# Patient Record
Sex: FEMALE | Race: WHITE | NOT HISPANIC OR LATINO | URBAN - METROPOLITAN AREA
[De-identification: names, ages, dates, MRNs, and addresses within clinical notes are randomized per-mention and may not be internally consistent; named-entity substitution may affect disease eponyms.]

---

## 2017-03-20 ENCOUNTER — TELEPHONE (OUTPATIENT)
Dept: NURSING | Facility: CLINIC | Age: 27
End: 2017-03-20

## 2017-03-20 ENCOUNTER — OFFICE VISIT (OUTPATIENT)
Dept: FAMILY MEDICINE | Facility: CLINIC | Age: 27
End: 2017-03-20

## 2017-03-20 VITALS
TEMPERATURE: 98.3 F | BODY MASS INDEX: 23.73 KG/M2 | DIASTOLIC BLOOD PRESSURE: 76 MMHG | HEART RATE: 75 BPM | OXYGEN SATURATION: 99 % | SYSTOLIC BLOOD PRESSURE: 116 MMHG | WEIGHT: 148 LBS

## 2017-03-20 DIAGNOSIS — H66.91 RIGHT ACUTE OTITIS MEDIA: Primary | ICD-10-CM

## 2017-03-20 ASSESSMENT — PAIN SCALES - GENERAL: PAINLEVEL: MILD PAIN (3)

## 2017-03-20 NOTE — LETTER
March 20, 2017    RE: Katie Means  [address] [place] [address]        To whom it may concern:     Katie was seen in clinic today due to severe ear infection. She may need to postpone her travel for 48 hours after starting antibiotics. Please contact me with any questions.    Regards,       Essie Lugo PA-C

## 2017-03-20 NOTE — NURSING NOTE
Chief Complaint   Patient presents with     Ear Fullness     both ears, pain in right one.     26 year old      Blood pressure 116/76, pulse 75, temperature 98.3  F (36.8  C), temperature source Oral, weight 148 lb (67.1 kg), last menstrual period 03/13/2017, SpO2 99 %. Body mass index is 23.73 kg/(m^2).    Wt Readings from Last 2 Encounters:   03/20/17 148 lb (67.1 kg)   12/12/16 148 lb (67.1 kg)     BP Readings from Last 3 Encounters:   03/20/17 116/76   12/12/16 117/78   11/21/16 119/80       Patient Active Problem List   Diagnosis     Flexural eczema     MTHFR mutation (H)     Tension headache     History of urinary reflux       Current Outpatient Prescriptions   Medication Sig Dispense Refill     Ibuprofen (ADVIL PO) Take by mouth as needed for moderate pain       Levonorgestrel (JONEL IU)          Social History   Substance Use Topics     Smoking status: Never Smoker     Smokeless tobacco: Never Used     Alcohol use 0.0 oz/week     0 Standard drinks or equivalent per week      Comment: 4/week         Health Maintenance Due   Topic Date Due     CHLAMYDIA SCREENING  1990     HPV IMMUNIZATION (1 of 3 - Female 3 Dose Series) 05/24/2003       WHIT BARONE CMA  March 20, 2017 9:07 AM

## 2017-03-20 NOTE — TELEPHONE ENCOUNTER
"Call Type: Triage Call    Presenting Problem: Patient is having \"pressure and pain in the right  ear\" and is unable to sleep due to discomfort. Triaged for Ear:  symptoms/Disposition to see provider within 8 hours.  Triage Note:  Guideline Title: Ear: Symptoms  Recommended Disposition: See Provider within 8 Hours  Original Inclination: Wanted to speak with a nurse  Override Disposition:  Intended Action: See Dr/Keaton Appt  Physician Contacted: No  Severe pain (sharp, stabbing, throbbing or excruciating aching) unresponsive to 24  hours of home care ?  YES  Laceration/abrasion of ear ? NO  Bloody ear drainage ? NO  Any ear drainage in immunocompromised person ? NO  Blunt ear trauma resulting in a small mass/knot (hematoma) of the ear lobe ? NO  Recent trauma AND blood or clear fluid draining from ear(s) OR new deafness or  marked decrease in hearing ? NO  Sudden complete or partial hearing loss (three days or less) not associated with  any other signs or symptoms ? NO  Other head injury is of most concern ? NO  Any temperature elevation in an immunocompromised individual OR frail elderly with  signs of dehydration ? NO  Physician Instructions:  Care Advice: A warm washcloth or heating pad set on low to the affected ear  may help relieve the discomfort. May apply for 15 to 20 minutes, 3 to 4  times a day.  During pregnancy, call provider if temperature is 100 F (37.7 C) or greater  OR any temperature elevation for 3 days even while taking acetaminophen.  Do not use eardrops unless directed by a healthcare provider, especially if  having ear drainage.  Resting or sleeping with head elevated, such as semi-reclining in a  recliner, may help reduce inner ear pressure and discomfort.  CAUTIONS  SYMPTOM / CONDITION MANAGEMENT  Speak with provider as soon as possible if having:  - discharge from ear  that does not look like earwax or that has bad odor  - increased redness or  swelling of external ear  - worsening pain  - new " or worsening dizziness  -  or unsteadiness.  Keep the ear canal as dry as possible. To keep water out of ear when  showering or washing your hair, gently place a cotton ball in ear opening  and put petroleum jelly on the outside of the cotton ball. Avoid swimming  or water sports until okayed by provider.  Analgesic/Antipyretic Advice - NSAIDs: Consider aspirin, ibuprofen,  naproxen or ketoprofen for pain or fever as directed on label or by  pharmacist/provider. PRECAUTIONS: - You should not take this medicine for  more than 10 days unless recommended by your provider. EXCEPTIONS: - Should  not be used if taking blood thinners or have bleeding problems. - Do not  use if have history of sensitivity/allergy to any of these medications  or history of cardiovascular, ulcer, kidney, liver disease or diabetes  unless approved by provider. - Do not exceed recommended dose or frequency.  Analgesic/Antipyretic Advice - Acetaminophen: Consider acetaminophen as  directed on label or by pharmacist/provider for pain or fever. PRECAUTIONS:  - Use if there is no history of liver disease, alcoholism, or intake of  three or more alcohol drinks per day - Only if approved by provider during  pregnancy or when breastfeeding - Do not exceed recommended dose or  frequency. Do not take more than 3000 milligrams (mg) in 24 hours. Do not  take this medicine for more than 10 days unless recommended by your  provider. - During pregnancy, acetaminophen should not be taken more than 3  consecutive days without telling provider - To make sure you don't take too  much, check other medicines you take to see if they also contain  acetaminophen.

## 2017-03-20 NOTE — MR AVS SNAPSHOT
After Visit Summary   3/20/2017    Katie Means    MRN: [id number]           Patient Information     Date Of Birth          1990        Visit Information        Provider Department      3/20/2017 9:00 AM Essie Lugo PA-C [facility]        Today's Diagnoses     Right acute otitis media    -  1       Follow-ups after your visit        Who to contact     Please call your clinic at [phone] to:    Ask questions about your health    Make or cancel appointments    Discuss your medicines    Learn about your test results    Speak to your doctor   If you have compliments or concerns about an experience at your clinic, or if you wish to file a complaint, please contact [facility] Patient Relations at [phone] or email us at [email or web address]         Additional Information About Your Visit        MyChart Information     FLIP4NEW is an electronic gateway that provides easy, online access to your medical records. With [email or web address], you can request a clinic appointment, read your test results, renew a prescription or communicate with your care team.     To sign up for Ubersenset visit the website at [email or web address]   You will be asked to enter the access code listed below, as well as some personal information. Please follow the directions to create your username and password.     Your access code is: [email or web address]  Expires: 2017 10:22 AM     Your access code will  in 90 days. If you need help or a new code, please contact your [facility] or call [phone] for assistance.        Care EveryWhere ID     This is your Care EveryWhere ID. This could be used by other organizations to access your [facility] medical records  [id number]        Your Vitals Were     Pulse Temperature Last Period Pulse Oximetry BMI (Body Mass Index)       75 98.3  F (36.8  C) (Oral) 2017 99% 23.73 kg/m2        Blood Pressure  from Last 3 Encounters:   03/20/17 116/76   12/12/16 117/78   11/21/16 119/80    Weight from Last 3 Encounters:   03/20/17 148 lb (67.1 kg)   12/12/16 148 lb (67.1 kg)   11/21/16 144 lb 12 oz (65.7 kg)              Today, you had the following     No orders found for display         Today's Medication Changes          These changes are accurate as of: 3/20/17 10:22 AM.  If you have any questions, ask your nurse or doctor.               Start taking these medicines.        Dose/Directions    amoxicillin-clavulanate 875-125 MG per tablet   Commonly known as:  AUGMENTIN   Used for:  Right acute otitis media   Started by:  Essie Lugo PA-C        Dose:  1 tablet   Take 1 tablet by mouth 2 times daily   Quantity:  20 tablet   Refills:  0         Stop taking these medicines if you haven't already. Please contact your care team if you have questions.     albuterol 108 (90 BASE) MCG/ACT Inhaler   Commonly known as:  PROVENTIL HFA   Stopped by:  Essie Lugo PA-C           fluticasone 50 MCG/ACT spray   Commonly known as:  FLONASE   Stopped by:  Essie Lugo PA-C           pseudoePHEDrine 30 MG tablet   Commonly known as:  SUDAFED   Stopped by:  Essie Lugo PA-C                Where to get your medicines      These medications were sent to [facility]/pharmacy #[id number] - [place], [place]  [address] [address]     Phone:  [phone]     amoxicillin-clavulanate 875-125 MG per tablet                Primary Care Provider Office Phone # Fax #    Steph Lowe [phone]       [facility] [address]  [facility] [address]        Thank you!     Thank you for choosing [facility]  for your care. Our goal is always to provide you with excellent care. Hearing back from our patients is one way we can continue to improve our services. Please take a few minutes to complete the written survey that you may receive in the mail after your visit  with us. Thank you!             Your Updated Medication List - Protect others around you: Learn how to safely use, store and throw away your medicines at www.disposemymeds.org.          This list is accurate as of: 3/20/17 10:22 AM.  Always use your most recent med list.                   Brand Name Dispense Instructions for use    ADVIL PO      Take by mouth as needed for moderate pain       amoxicillin-clavulanate 875-125 MG per tablet    AUGMENTIN    20 tablet    Take 1 tablet by mouth 2 times daily       JONEL PALMER

## 2018-02-12 ENCOUNTER — NURSE TRIAGE (OUTPATIENT)
Dept: NURSING | Facility: CLINIC | Age: 28
End: 2018-02-12

## 2018-02-12 NOTE — TELEPHONE ENCOUNTER
who was Exposed to flu Dx one month ago and  currently exposed to  Pneumonia  For last 2 weeks  .  Cough started 48 hours without fever. Throat pain with coughing . Currently : 1&0 is ok, and activity is some less. Triage guideline  for cough - adult is homecare and call back if any further problems 24/7 , see care advice . Pt agrees and will watch for fever or shortness of breath .  FNA offered her appt but Pt prefers to watch symptoms for now.   .Lisset Abdi RN [facility] nurse advisors.    Additional Information    Negative: Severe difficulty breathing (e.g., struggling for each breath, speaks in single words)    Negative: Bluish lips, tongue, or face now    Negative: [1] Difficulty breathing AND [2] exposure to flames, smoke, or fumes    Negative: [1] Stridor AND [2] difficulty breathing    Negative: Sounds like a life-threatening emergency to the triager    Negative: [1] Previous asthma attacks AND [2] this feels like asthma attack    Negative: Dry (non-productive) cough (i.e., no sputum or minimal clear sputum)    Negative: Chest pain  (Exception: MILD central chest pain, present only when coughing)    Negative: Difficulty breathing    Negative: Patient sounds very sick or weak to the triager    Negative: [1] Coughed up blood AND [2] > 1 tablespoon (15 ml) (Exception: blood-tinged sputum)    Negative: Fever > 103 F (39.4 C)    Negative: [1] Fever > 101 F (38.3 C) AND [2] age > 60    Negative: [1] Fever > 101 F (38.3 C) AND [2] bedridden (e.g., nursing home patient, CVA, chronic illness, recovering from surgery)    Negative: [1] Fever > 100.5 F (38.1 C) AND [2] diabetes mellitus or weak immune system (e.g., HIV positive, cancer chemo, splenectomy, organ transplant, chronic steroids)    Negative: Wheezing is present    Negative: SEVERE coughing spells (e.g., whooping sound after coughing, vomiting after coughing)    Negative: [1] Continuous (nonstop) coughing interferes with work or school AND [2] no  improvement using cough treatment per Care Advice    Negative: Coughing up sydnee-colored (reddish-brown) sputum    Negative: Fever present > 3 days (72 hours)    Negative: [1] Fever returns after gone for over 24 hours AND [2] symptoms worse or not improved    Negative: [1] Sinus pain (around cheekbone or eye) AND [2] present > 24 hours using nasal washes and pain meds    Negative: Earache    Negative: [1] Known COPD or other severe lung disease (i.e., bronchiectasis, cystic fibrosis, lung surgery) AND [2] worsening symptoms (i.e., increased sputum purulence or amount, increased breathing difficulty    Negative: [1] Coughed up blood-tinged sputum AND [2] more than once    Negative: Cough present > 10 days    Negative: [1] Nasal discharge AND [2] present > 10 days    Negative: Exposure to TB (Tuberculosis)    Cough (all triage questions negative)    Protocols used: COUGH - ACUTE PRODUCTIVE-ADULT-

## 2018-02-15 ENCOUNTER — OFFICE VISIT (OUTPATIENT)
Dept: FAMILY MEDICINE | Facility: CLINIC | Age: 28
End: 2018-02-15
Payer: COMMERCIAL

## 2018-02-15 VITALS
WEIGHT: 146.25 LBS | SYSTOLIC BLOOD PRESSURE: 120 MMHG | OXYGEN SATURATION: 95 % | BODY MASS INDEX: 23.5 KG/M2 | DIASTOLIC BLOOD PRESSURE: 74 MMHG | HEIGHT: 66 IN | TEMPERATURE: 98.4 F | HEART RATE: 84 BPM

## 2018-02-15 DIAGNOSIS — J02.9 SORE THROAT: Primary | ICD-10-CM

## 2018-02-15 DIAGNOSIS — J06.9 VIRAL UPPER RESPIRATORY TRACT INFECTION: ICD-10-CM

## 2018-02-15 LAB — S PYO AG THROAT QL IA.RAPID: NEGATIVE

## 2018-02-15 ASSESSMENT — PAIN SCALES - GENERAL: PAINLEVEL: EXTREME PAIN (8)

## 2018-02-15 NOTE — PATIENT INSTRUCTIONS
A sinus infection is a secondary bacterial infection that usually occurs after a viral upper respiratory infection or with persistent congestion related to allergies. It can sometimes be treated without an antibiotic and the infection can resolve on its own.  For comfort saline nasal spray several times daily or use of a neti pot once daily can be helpful. (If you use a neti pot you should only use bottled/distilled water)  Over the counter flonase, two sprays in each nostril once daily will address the inflammation. You should use daily for atleast 2 weeks. If decongestants have been helpful for you in the past and you tolerate then we recommend Mucinex.  For the cough you can use Mucinex-DM.    Make sure you are drinking plenty of water, resting as able and you can use ibuprofen or tylenol for headache and fever if needed.    Please let me know if you have any questions.  Thank you for allowing me to participate in your care.  It was my pleasure meeting you.  Ana Maria Joya PA-C

## 2018-02-15 NOTE — PROGRESS NOTES
SUBJECTIVE:   Katie Means is a 27 year old female who presents to clinic today for the following health issues:      Acute Illness   Acute illness concerns: Sore thraot and sinus congestion for the past 4 days.      Fever: no    Chills/Sweats: no    Headache (location?): no    Sinus Pressure:YES    Conjunctivitis:  no    Ear Pain: no    Rhinorrhea: YES    Congestion: YES    Sore Throat: YES     Cough: YES-non-productive    Wheeze: no    Decreased Appetite: no    Nausea: no    Vomiting: no    Diarrhea:  no    Dysuria/Freq.: no    Fatigue/Achiness: no    Sick/Strep Exposure: YES- coworkers     Therapies Tried and outcome: Nothing currently    Problem list and histories reviewed & adjusted, as indicated.  Additional history: as documented    Patient Active Problem List   Diagnosis     Flexural eczema     MTHFR mutation (H)     Tension headache     History of urinary reflux     Past Surgical History:   Procedure Laterality Date     C LEVONORGESTREL-RELEASE INTRAUTERINE CONTRACEPTIVE (JONEL), 13.5 MG  4/2016    Jonel IUS, change in 3 yrs       Social History   Substance Use Topics     Smoking status: Never Smoker     Smokeless tobacco: Never Used     Alcohol use 0.0 oz/week     0 Standard drinks or equivalent per week      Comment: 4/week     Family History   Problem Relation Age of Onset     HEART DISEASE Maternal Grandmother      Hypertension Maternal Grandmother      HEART DISEASE Maternal Grandfather      Hypertension Maternal Grandfather      Hyperlipidemia Paternal Grandmother      Pancreatic Cancer Paternal Grandmother      Hyperlipidemia Paternal Grandfather      Hypertension Paternal Uncle      Lymphoma Maternal Uncle      Migraines Mother      CLOTTING DISORDER Maternal Uncle      Factor V Leiden         Current Outpatient Prescriptions   Medication Sig Dispense Refill     Ibuprofen (ADVIL PO) Take by mouth as needed for moderate pain       Levonorgestrel (JONEL IU)        amoxicillin-clavulanate (AUGMENTIN)  "875-125 MG per tablet Take 1 tablet by mouth 2 times daily (Patient not taking: Reported on 2/15/2018) 20 tablet 0       Reviewed and updated as needed this visit by clinical staff  Tobacco  Allergies  Meds  Problems  Med Hx  Surg Hx  Fam Hx  Soc Hx        Reviewed and updated as needed this visit by Provider  Allergies  Meds  Problems         ROS:  Constitutional, HEENT, cardiovascular, pulmonary, gi and gu systems are negative, except as otherwise noted.    OBJECTIVE:     /74 (BP Location: Right arm, Patient Position: Chair, Cuff Size: Adult Regular)  Pulse 84  Temp 98.4  F (36.9  C) (Temporal)  Ht 5' 6.42\" (168.7 cm)  Wt 146 lb 4 oz (66.3 kg)  SpO2 95%  BMI 23.31 kg/m2  Body mass index is 23.31 kg/(m^2).  GENERAL: healthy, alert and no distress  EYES: Eyes grossly normal to inspection, PERRL and conjunctivae and sclerae normal  HENT: ear canals and TM's normal,  mouth without ulcers or lesions.  No sinus pain with pressure. Posterior pharynx injected without inflammation or exudate.  NECK: no adenopathy, no asymmetry, masses, or scars and thyroid normal to palpation  RESP: lungs clear to auscultation - no rales, rhonchi or wheezes  CV: regular rate and rhythm, normal S1 S2, no S3 or S4, no murmur, click or rub, no peripheral edema and peripheral pulses strong  SKIN: no suspicious lesions or rashes    Diagnostic Test Results:  Results for orders placed or performed in visit on 02/15/18   Rapid Strep Screen (Group) ([place])   Result Value Ref Range    Rapid Strep A Screen NEGATIVE Negative   Throat Culture Aerobic Bacterial   Result Value Ref Range    Specimen Description Throat     Culture Micro Moderate growth  Normal celestine          ASSESSMENT/PLAN:       ICD-10-CM    1. Sore throat J02.9 Rapid Strep Screen (Group) ([place])     Throat Culture Aerobic Bacterial   2. Viral upper respiratory tract infection J06.9     B97.89        Patient Instructions   A sinus infection is a secondary " bacterial infection that usually occurs after a viral upper respiratory infection or with persistent congestion related to allergies. It can sometimes be treated without an antibiotic and the infection can resolve on its own.  For comfort saline nasal spray several times daily or use of a neti pot once daily can be helpful. (If you use a neti pot you should only use bottled/distilled water)  Over the counter flonase, two sprays in each nostril once daily will address the inflammation. You should use daily for atleast 2 weeks. If decongestants have been helpful for you in the past and you tolerate then we recommend Mucinex.  For the cough you can use Mucinex-DM.    Make sure you are drinking plenty of water, resting as able and you can use ibuprofen or tylenol for headache and fever if needed.    Please let me know if you have any questions.  Thank you for allowing me to participate in your care.  It was my pleasure meeting you.  Ana Maria Joya PA-C  [facility]

## 2018-02-15 NOTE — NURSING NOTE
"27 year old  Chief Complaint   Patient presents with     Recheck Medication     Onset: 5 days ago. Pt states she has a sore throat, sinuses hurt, ears hurt (they can't pop), cough, hard to swallow. Pain is an 8 in her throat.       Blood pressure 120/74, pulse 84, temperature 98.4  F (36.9  C), temperature source Temporal, height 5' 6.42\" (168.7 cm), weight 146 lb 4 oz (66.3 kg), SpO2 95 %. Body mass index is 23.31 kg/(m^2).  Patient Active Problem List   Diagnosis     Flexural eczema     MTHFR mutation (H)     Tension headache     History of urinary reflux       Wt Readings from Last 2 Encounters:   02/15/18 146 lb 4 oz (66.3 kg)   03/20/17 148 lb (67.1 kg)     BP Readings from Last 3 Encounters:   02/15/18 120/74   03/20/17 116/76   12/12/16 117/78         Current Outpatient Prescriptions   Medication     Ibuprofen (ADVIL PO)     Levonorgestrel (JONEL IU)     amoxicillin-clavulanate (AUGMENTIN) 875-125 MG per tablet     No current facility-administered medications for this visit.        Social History   Substance Use Topics     Smoking status: Never Smoker     Smokeless tobacco: Never Used     Alcohol use 0.0 oz/week     0 Standard drinks or equivalent per week      Comment: 4/week       Health Maintenance Due   Topic Date Due     HPV IMMUNIZATION (1 of 3 - Female 3 Dose Series) 05/24/2003     INFLUENZA VACCINE (SYSTEM ASSIGNED)  09/01/2017       Lab Results   Component Value Date    PAP NIL 11/21/2016       Julia Ortega MA  February 15, 2018 4:33 PM    "

## 2018-02-18 LAB
BACTERIA SPEC CULT: NORMAL
SPECIMEN SOURCE: NORMAL

## 2018-04-05 NOTE — PROGRESS NOTES
Katie is a 27 year old female who presents to [facility] with IUD concerns:   HPI:  Jonel IUD placed April 2016 [second IUD].  [facility] at [facility].  - Typically a normal light period every months since having the IUD but recently had spotting and a period one week early.  No pain or other issues.  Wondering if this is normal and if the IUD is in place.   ROS:  General: none  Head/Eyes: none  Ears/Nose/Throat: none  Cardiovascular: none  Respiratory: none  Mental Health: none  Endocrine: none  PROBLEM LIST:  Patient Active Problem List   Diagnosis     Flexural eczema     MTHFR mutation (H)     Tension headache     History of urinary reflux   OB/GYN HISTORY:   G0. Sexually active.   Obstetric History     No data available      PAST MEDICAL HISTORY:  No past medical history on file.  Life Style Modifiers:   Tobacco:  reports that she has never smoked. She has never used smokeless tobacco.   Alcohol:  reports that she drinks alcohol.   Drug use:  reports that she does not use illicit drugs.  PAST SURGICAL HISTORY:  Past Surgical History:   Procedure Laterality Date     C LEVONORGESTREL-RELEASE INTRAUTERINE CONTRACEPTIVE (JONEL), 13.5 MG  4/2016    Jonel IUS, change in 3 yrs   FAMILY HISTORY:  Family History   Problem Relation Age of Onset     HEART DISEASE Maternal Grandmother      Hypertension Maternal Grandmother      HEART DISEASE Maternal Grandfather      Hypertension Maternal Grandfather      Hyperlipidemia Paternal Grandmother      Pancreatic Cancer Paternal Grandmother      Hyperlipidemia Paternal Grandfather      Hypertension Paternal Uncle      Lymphoma Maternal Uncle      Migraines Mother      CLOTTING DISORDER Maternal Uncle      Factor V Leiden   SOCIAL HISTORY: Actorr - now at the [employer]  Social History     Social History     Marital status: Single     Spouse name: N/A     Number of children: N/A     Years of education: N/A     Occupational History     Not on file.     Social History Main Topics  "    Smoking status: Never Smoker     Smokeless tobacco: Never Used     Alcohol use 0.0 oz/week     0 Standard drinks or equivalent per week      Comment: 4/week     Drug use: No     Sexual activity: Yes     Partners: Male     Birth control/ protection: IUD     Other Topics Concern     Not on file     Social History Narrative   MEDICATIONS:  Current Outpatient Prescriptions   Medication Sig Dispense Refill     amoxicillin-clavulanate (AUGMENTIN) 875-125 MG per tablet Take 1 tablet by mouth 2 times daily (Patient not taking: Reported on 2/15/2018) 20 tablet 0     Ibuprofen (ADVIL PO) Take by mouth as needed for moderate pain       Levonorgestrel (JONEL IU)      ALLERGIES:  No clinical screening - see comments  VITALS:  /74  Pulse 70  Temp 98.3  F (36.8  C) (Oral)  Ht 5' 6.42\" (168.7 cm)  Wt 137 lb 8 oz (62.4 kg)  LMP 03/13/2018  SpO2 100%  BMI 21.92 kg/m2  PHYSICAL EXAM:  Constitutional: Well appearing woman in no acute distress.  Psychological: appropriate mood.  Genitourinary: External genitalia is normal appearance. No enlargement of the [place] or [place] glands. Urethra and bladder are non-tender. Vagina is without lesions or discharge. Normal epithelium, no anterior or posterior wall defects. Cervix is smooth, without lesions, the IUD string is in place. There is blood in the vaginal vault.  Adnexa without tenderness or enlarged.  Neurological: normal gait, no tremor.   Diagnoses and associated orders for this visit:  27 year old woman who has had a ann iud for five years with regular light periods.  This month the bleeding has been irregular.   Dysfunctional uterine bleeding with IUD in Place  -     US GYN Pelvic, Complete (In Clinic); Future        -     Pelvic exam shows a IUDstring in place because of the amount of bleeding and minimal fullness in the right adnexa will proceed with Pelvic US.         -     Pelvic US assure correct placement and normal endometrim        -     Suggested Mirena " IUD on replacement next year.

## 2018-04-06 ENCOUNTER — OFFICE VISIT (OUTPATIENT)
Dept: FAMILY MEDICINE | Facility: CLINIC | Age: 28
End: 2018-04-06
Payer: COMMERCIAL

## 2018-04-06 VITALS
WEIGHT: 137.5 LBS | TEMPERATURE: 98.3 F | OXYGEN SATURATION: 100 % | BODY MASS INDEX: 22.1 KG/M2 | SYSTOLIC BLOOD PRESSURE: 106 MMHG | DIASTOLIC BLOOD PRESSURE: 74 MMHG | HEART RATE: 70 BPM | HEIGHT: 66 IN

## 2018-04-06 DIAGNOSIS — T83.9XXS COMPLICATION OF INTRAUTERINE DEVICE (IUD), UNSPECIFIED COMPLICATION, SEQUELA: ICD-10-CM

## 2018-04-06 DIAGNOSIS — N93.8 DYSFUNCTIONAL UTERINE BLEEDING: Primary | ICD-10-CM

## 2018-04-06 NOTE — NURSING NOTE
"27 year old  Chief Complaint   Patient presents with     Vaginal Problem     pt is on 3 year venkatesh birth control. pt reports having a regular then a week later having some spotting for a few days and a week after that she had another cycle.       Blood pressure 106/74, pulse 70, temperature 98.3  F (36.8  C), temperature source Oral, height 5' 6.42\" (168.7 cm), weight 137 lb 8 oz (62.4 kg), last menstrual period 03/13/2018, SpO2 100 %. Body mass index is 21.92 kg/(m^2).  Patient Active Problem List   Diagnosis     Flexural eczema     MTHFR mutation (H)     Tension headache     History of urinary reflux       Wt Readings from Last 2 Encounters:   04/06/18 137 lb 8 oz (62.4 kg)   02/15/18 146 lb 4 oz (66.3 kg)     BP Readings from Last 3 Encounters:   04/06/18 106/74   02/15/18 120/74   03/20/17 116/76         Current Outpatient Prescriptions   Medication     amoxicillin-clavulanate (AUGMENTIN) 875-125 MG per tablet     Ibuprofen (ADVIL PO)     Levonorgestrel (VENKATESH IU)     No current facility-administered medications for this visit.        Social History   Substance Use Topics     Smoking status: Never Smoker     Smokeless tobacco: Never Used     Alcohol use 0.0 oz/week     0 Standard drinks or equivalent per week      Comment: 4/week       Health Maintenance Due   Topic Date Due     HPV IMMUNIZATION (1 of 3 - Female 3 Dose Series) 05/24/2003       Lab Results   Component Value Date    PAP NIL 11/21/2016 April 6, 2018 11:43 AM  "

## 2018-04-06 NOTE — MR AVS SNAPSHOT
After Visit Summary   2018    Katie Means    MRN: [id number]           Patient Information     Date Of Birth          1990        Visit Information        Provider Department      2018 11:40 AM Gisselle Riggins MD [facility]        Today's Diagnoses     Dysfunctional uterine bleeding    -  1    Complication of intrauterine device (IUD), unspecified complication, sequela          Care Instructions    Pelvic US - Call [phone]          Follow-ups after your visit        Future tests that were ordered for you today     Open Future Orders        Priority Expected Expires Ordered    US GYN Pelvic, Complete (In Clinic) Routine  2019            Who to contact     Please call your clinic at [phone] to:    Ask questions about your health    Make or cancel appointments    Discuss your medicines    Learn about your test results    Speak to your doctor            Additional Information About Your Visit        MyChart Information     [employer] is an electronic gateway that provides easy, online access to your medical records. With [employer], you can request a clinic appointment, read your test results, renew a prescription or communicate with your care team.     To sign up for World[employer] visit the website at [email or web address]   You will be asked to enter the access code listed below, as well as some personal information. Please follow the directions to create your username and password.     Your access code is: [email or web address]  Expires: 2018  5:24 PM     Your access code will  in 90 days. If you need help or a new code, please contact your [facility] or call [phone] for assistance.        Care EveryWhere ID     This is your Care EveryWhere ID. This could be used by other organizations to access your [place] medical records  [id number]        Your Vitals Were     Pulse Temperature Height Last Period Pulse Oximetry BMI (Body  "Mass Index)    70 98.3  F (36.8  C) (Oral) 1.687 m (5' 6.42\") 03/13/2018 100% 21.92 kg/m2       Blood Pressure from Last 3 Encounters:   04/06/18 106/74   02/15/18 120/74   03/20/17 116/76    Weight from Last 3 Encounters:   04/06/18 62.4 kg (137 lb 8 oz)   02/15/18 66.3 kg (146 lb 4 oz)   03/20/17 67.1 kg (148 lb)               Primary Care Provider Office Phone # Fax #    Steph Lowe [phone]       [address] [place] [address]        Equal Access to Services     ANTOINETTE CALVO : Alex Deras, wajenni lukaitlinadaha, qaybta kaalmada shaila, montez noyola . So [facility] [phone].    ATENCIÓN: Si habla español, tiene a grove disposición servicios gratuitos de asistencia lingüística. [facility] [phone].    We comply with applicable federal civil rights laws and [place] laws. We do not discriminate on the basis of race, color, national origin, age, disability, sex, sexual orientation, or gender identity.            Thank you!     Thank you for choosing [facility]  for your care. Our goal is always to provide you with excellent care. Hearing back from our patients is one way we can continue to improve our services. Please take a few minutes to complete the written survey that you may receive in the mail after your visit with us. Thank you!             Your Updated Medication List - Protect others around you: Learn how to safely use, store and throw away your medicines at www.disposemymeds.org.          This list is accurate as of 4/6/18 11:59 AM.  Always use your most recent med list.                   Brand Name Dispense Instructions for use Diagnosis    ADVIL PO      Take by mouth as needed for moderate pain        amoxicillin-clavulanate 875-125 MG per tablet    AUGMENTIN    20 tablet    Take 1 tablet by mouth 2 times daily    Right acute otitis media       JONEL IU             "

## 2018-04-09 ENCOUNTER — OFFICE VISIT (OUTPATIENT)
Dept: OBGYN | Facility: CLINIC | Age: 28
End: 2018-04-09
Attending: FAMILY MEDICINE
Payer: COMMERCIAL

## 2018-04-09 DIAGNOSIS — N92.6 IRREGULAR MENSTRUAL CYCLE: Primary | ICD-10-CM

## 2018-04-09 NOTE — MR AVS SNAPSHOT
After Visit Summary   4/9/2018    Katie Means    MRN: [id number]           Patient Information     Date Of Birth          1990        Visit Information        Provider Department      4/9/2018 9:00 AM [facility] ULTRAS[facility]        Today's Diagnoses     Irregular menstrual cycle    -  1       Follow-ups after your visit        Who to contact     Please call your clinic at [phone] to:    Ask questions about your health    Make or cancel appointments    Discuss your medicines    Learn about your test results    Speak to your doctor            Additional Information About Your Visit        VideoBursthart Information     Zuki gives you secure access to your electronic health record. If you see a primary care provider, you can also send messages to your care team and make appointments. If you have questions, please call your primary care clinic.  If you do not have a primary care provider, please call [phone] and they will assist you.      Zuki is an electronic gateway that provides easy, online access to your medical records. With Zuki, you can request a clinic appointment, read your test results, renew a prescription or communicate with your care team.     To access your existing account, please contact your [facility] or call [phone] for assistance.        Care EveryWhere ID     This is your Care EveryWhere ID. This could be used by other organizations to access your [place] medical records  [id number]        Your Vitals Were     Last Period                   03/13/2018            Blood Pressure from Last 3 Encounters:   04/06/18 106/74   02/15/18 120/74   03/20/17 116/76    Weight from Last 3 Encounters:   04/06/18 62.4 kg (137 lb 8 oz)   02/15/18 66.3 kg (146 lb 4 oz)   03/20/17 67.1 kg (148 lb)              Today, you had the following     No orders found for display       Primary Care Provider Office Phone # Fax #     Steph Lowe [phone]       [address] [place] [address]        Equal Access to Services     ANTOINETTE CALVO : Hadii aad ku hadloganhayden Deras, elaine chilel, matizurdo russmasharon mccallnorbertosharon, montez diyain hayaanohemy mccallalcides duarte jazmín lucio. So [facility] [phone].    ATENCIÓN: Si habla español, tiene a grove disposición servicios gratuitos de asistencia lingüística. Llame al [phone].    We comply with applicable federal civil rights laws and [place] laws. We do not discriminate on the basis of race, color, national origin, age, disability, sex, sexual orientation, or gender identity.            Thank you!     Thank you for choosing [facility]  for your care. Our goal is always to provide you with excellent care. Hearing back from our patients is one way we can continue to improve our services. Please take a few minutes to complete the written survey that you may receive in the mail after your visit with us. Thank you!             Your Updated Medication List - Protect others around you: Learn how to safely use, store and throw away your medicines at www.disposemymeds.org.          This list is accurate as of 4/9/18  2:21 PM.  Always use your most recent med list.                   Brand Name Dispense Instructions for use Diagnosis    ADVIL PO      Take by mouth as needed for moderate pain        amoxicillin-clavulanate 875-125 MG per tablet    AUGMENTIN    20 tablet    Take 1 tablet by mouth 2 times daily    Right acute otitis media       JONEL IU

## 2018-04-09 NOTE — LETTER
4/9/2018      RE: Katie Means  [address]   [facility] [address]       27 year old female with LMP 03/15/2018 presents for gynecologic ultrasound indicated by irregular bleeding, Valeria IUD, right adnexal fullness on exam.  This study was done transvaginally.    Uterine findings:   Presence: Visible Size: Normal 4.3x4.4x2.7 cm.  Endometrium = 2.6 mm. IUD in place.   Cx length = 40.7 mm.      Flexion:  Anteverted Position: Midline Margins: Smooth Shape: Normal   Contour: Regular Texture: Homogeneous Cavity: Normal Masses: Normal    Pelvic findings:    Right Adnexa: Normal   Left Adnexa: Normal   Bladder:  Normal         Cul - de - sac fluid: None    Ovarian follicles:   Right ovary:  3.6x3.8x2.9cm.     1 follicles     Size(s):  3.2 x 2.6 x 2.1cm     Left ovary:  3.3x2.5x2.3cm.     Small follicles     Size(s):  Less than 5mm    Comments:  Thin endometrial lining, Valeria in correct position.  Dominant follicle on right ovary, otherwise no other concerning findings.    JACOB Montes MD    S Ultrasound

## 2018-04-09 NOTE — PROGRESS NOTES
27 year old female with LMP 03/15/2018 presents for gynecologic ultrasound indicated by irregular bleeding, Valeria IUD, right adnexal fullness on exam.  This study was done transvaginally.    Uterine findings:   Presence: Visible Size: Normal 4.3x4.4x2.7 cm.  Endometrium = 2.6 mm. IUD in place.   Cx length = 40.7 mm.      Flexion:  Anteverted Position: Midline Margins: Smooth Shape: Normal   Contour: Regular Texture: Homogeneous Cavity: Normal Masses: Normal    Pelvic findings:    Right Adnexa: Normal   Left Adnexa: Normal   Bladder:  Normal         Cul - de - sac fluid: None    Ovarian follicles:   Right ovary:  3.6x3.8x2.9cm.     1 follicles     Size(s):  3.2 x 2.6 x 2.1cm     Left ovary:  3.3x2.5x2.3cm.     Small follicles     Size(s):  Less than 5mm    Comments:  Thin endometrial lining, Valeria in correct position.  Dominant follicle on right ovary, otherwise no other concerning findings.    JACOB Montes MD

## 2018-04-09 NOTE — LETTER
4/9/2018       RE: Katie Means  [address]   [facility] [address]     Dear Colleague,    Thank you for referring your patient, Katie Means, to the [facility] at [facility]. Please see a copy of my visit note below.    27 year old female with LMP 03/15/2018 presents for gynecologic ultrasound indicated by irregular bleeding, Valeria IUD, right adnexal fullness on exam.  This study was done transvaginally.    Uterine findings:   Presence: Visible Size: Normal 4.3x4.4x2.7 cm.  Endometrium = 2.6 mm. IUD in place.   Cx length = 40.7 mm.      Flexion:  Anteverted Position: Midline Margins: Smooth Shape: Normal   Contour: Regular Texture: Homogeneous Cavity: Normal Masses: Normal    Pelvic findings:    Right Adnexa: Normal   Left Adnexa: Normal   Bladder:  Normal         Cul - de - sac fluid: None    Ovarian follicles:   Right ovary:  3.6x3.8x2.9cm.     1 follicles     Size(s):  3.2 x 2.6 x 2.1cm     Left ovary:  3.3x2.5x2.3cm.     Small follicles     Size(s):  Less than 5mm    Comments:  Thin endometrial lining, Valeria in correct position.  Dominant follicle on right ovary, otherwise no other concerning findings.    JACOB Montes MD    Again, thank you for allowing me to participate in the care of your patient.      Sincerely,    [facility]

## 2019-02-04 ENCOUNTER — OFFICE VISIT (OUTPATIENT)
Dept: FAMILY MEDICINE | Facility: CLINIC | Age: 29
End: 2019-02-04
Payer: COMMERCIAL

## 2019-02-04 VITALS
RESPIRATION RATE: 16 BRPM | SYSTOLIC BLOOD PRESSURE: 109 MMHG | WEIGHT: 142 LBS | BODY MASS INDEX: 22.63 KG/M2 | TEMPERATURE: 98.4 F | OXYGEN SATURATION: 100 % | DIASTOLIC BLOOD PRESSURE: 72 MMHG | HEART RATE: 114 BPM

## 2019-02-04 DIAGNOSIS — J02.9 SORE THROAT: ICD-10-CM

## 2019-02-04 DIAGNOSIS — R52 GENERALIZED BODY ACHES: Primary | ICD-10-CM

## 2019-02-04 LAB
FLUAV AG UPPER RESP QL IA.RAPID: NEGATIVE
FLUBV AG UPPER RESP QL IA.RAPID: NEGATIVE
S PYO AG THROAT QL IA.RAPID: NEGATIVE

## 2019-02-04 RX ORDER — PENICILLIN V POTASSIUM 500 MG/1
500 TABLET, FILM COATED ORAL 2 TIMES DAILY
Qty: 20 TABLET | Refills: 0 | Status: SHIPPED | OUTPATIENT
Start: 2019-02-04 | End: 2019-02-04

## 2019-02-04 RX ORDER — PENICILLIN V POTASSIUM 500 MG/1
500 TABLET, FILM COATED ORAL 2 TIMES DAILY
Qty: 20 TABLET | Refills: 0 | Status: SHIPPED | OUTPATIENT
Start: 2019-02-04 | End: 2019-02-14

## 2019-02-04 NOTE — PROGRESS NOTES
Katie Means is a 28 year old female who presents to [facility] with pharyngitis, body aches and fever (to 100.6) for 2 days. Some fatigue. Denies cough. Some nausea. Slight headache.     Has otherwise been in usual state of health, e.g.   Cardiovascular: negative  Gastrointestinal: negative  Genitourinary: negative    Problem list per EMR:  Patient Active Problem List   Diagnosis     Flexural eczema     MTHFR mutation (H)     Tension headache     History of urinary reflux       Current Outpatient Medications   Medication Sig Dispense Refill     Ibuprofen (ADVIL PO) Take by mouth as needed for moderate pain       amoxicillin-clavulanate (AUGMENTIN) 875-125 MG per tablet Take 1 tablet by mouth 2 times daily (Patient not taking: Reported on 2/4/2019) 20 tablet 0     Levonorgestrel (JONEL IU)          Allergies   Allergen Reactions     No Clinical Screening - See Comments Nausea and Vomiting     Shell Fish         Social:     Works as an Actor    EXAM    Vitals: /72 (BP Location: Right arm, Patient Position: Sitting, Cuff Size: Adult Regular)   Pulse 114   Temp 98.4  F (36.9  C) (Oral)   Resp 16   Wt 64.4 kg (142 lb)   SpO2 100%   BMI 22.63 kg/m    BMI= Body mass index is 22.63 kg/m .  Appears well and in no distress.  HEENT remarkable for a very enlarged and erythematous LEFT tonsil. Slight exudate  Right tonsil appears normal  Tms - Normal  Neck supple. Shotty LEFT anterior Lymphadenopathy  CV--Tachycardic at about 110bpm. No murmurs.  Lungs - CTA    Results for orders placed or performed in visit on 02/04/19   Rapid Strep Screen (Group) ([facility])   Result Value Ref Range    Rapid Strep A Screen NEGATIVE Negative   Influenza A/B Antigen ([facility])   Result Value Ref Range    Influenza A NEGATIVE Negative    Influenza B NEGATIVE Negative       A/ Pharyngitis with unilateral markedly swollen tonsil and tachycardia    P/  Although the strep test was negative, her tonsil is markedly enlarged and given  the tachycardia, Katie is obviously fighting an infection (viral vs bacterial).  Suggested lots of fluids tonight. Also, use Aleve (discontinue ibuprofen)  If no better in 1-2 days, then start Pen 500 mg twice daily   Send a message with an update if possible. Return to clinic if needed or to Emergency room if tonsillar swelling worsens    --Ravin King MD  [facility], Department of Family Medicine and Community Health

## 2019-02-04 NOTE — NURSING NOTE
28 year old  Chief Complaint   Patient presents with     Pharyngitis     x 2 days     Generalized Body Aches     Fever     101.6 this morning       Blood pressure 109/72, pulse 114, temperature 98.4  F (36.9  C), temperature source Oral, resp. rate 16, weight 64.4 kg (142 lb), SpO2 100 %. Body mass index is 22.63 kg/m .  Patient Active Problem List   Diagnosis     Flexural eczema     MTHFR mutation (H)     Tension headache     History of urinary reflux       Wt Readings from Last 2 Encounters:   02/04/19 64.4 kg (142 lb)   04/06/18 62.4 kg (137 lb 8 oz)     BP Readings from Last 3 Encounters:   02/04/19 109/72   04/06/18 106/74   02/15/18 120/74         Current Outpatient Medications   Medication     Ibuprofen (ADVIL PO)     amoxicillin-clavulanate (AUGMENTIN) 875-125 MG per tablet     Levonorgestrel (JONEL IU)     No current facility-administered medications for this visit.        Social History     Tobacco Use     Smoking status: Never Smoker     Smokeless tobacco: Never Used   Substance Use Topics     Alcohol use: Yes     Alcohol/week: 0.0 oz     Comment: 4/week     Drug use: No       Health Maintenance Due   Topic Date Due     HPV IMMUNIZATION (1 - Female 3-dose series) 05/24/2003     HIV SCREEN (SYSTEM ASSIGNED)  05/24/2010     INFLUENZA VACCINE (1) 09/01/2018     PHQ-2 Q1 YR  02/15/2019       Lab Results   Component Value Date    PAP NIL 11/21/2016 February 4, 2019 3:06 PM

## 2019-02-04 NOTE — PATIENT INSTRUCTIONS
A/ Pharyngitis with unilateral markedly swollen tonsil and tachycardia    P/  Although the strep test was negative, her tonsil is markedly enlarged and given the tachycardia, Katie is obviously fighting an infection (viral vs bacterial).  Suggested lots of fluids tonight. Also, use Aleve (discontinue ibuprofen)  If no better in 1-2 days, then start Pen 500 mg twice daily   Send a message with an update if possible. Return to clinic if needed or to Emergency room if tonsillar swelling worsens

## 2019-02-06 LAB
BACTERIA SPEC CULT: ABNORMAL
BACTERIA SPEC CULT: ABNORMAL
Lab: ABNORMAL
SPECIMEN SOURCE: ABNORMAL

## 2019-10-16 ENCOUNTER — OFFICE VISIT (OUTPATIENT)
Dept: FAMILY MEDICINE | Facility: CLINIC | Age: 29
End: 2019-10-16
Payer: COMMERCIAL

## 2019-10-16 VITALS
SYSTOLIC BLOOD PRESSURE: 114 MMHG | TEMPERATURE: 98.5 F | WEIGHT: 140.25 LBS | DIASTOLIC BLOOD PRESSURE: 79 MMHG | BODY MASS INDEX: 22.35 KG/M2 | RESPIRATION RATE: 16 BRPM | HEART RATE: 69 BPM | OXYGEN SATURATION: 100 %

## 2019-10-16 DIAGNOSIS — H93.8X3 EAR CONGESTION, BILATERAL: Primary | ICD-10-CM

## 2019-10-16 NOTE — PROGRESS NOTES
"SUBJECTIVE:   Katie Means is a 29 year old female who presents to clinic today to discuss the following problem(s).    Ear Congestion  - for the past 2-3 weeks patient notes she has felt like one ear or the other, sometimes both, will feel \"clogged\" and it will feel like her hearing is muffle in that ear  - clogged ear changes back and forth  - she can feel facial congestion in maxillary sinus on the side her ear is plugged  - no pain, no heat, no dizziness  - no vision  changes  - no sore throat, no jaw tenderness, no difficulty swallowing  - no fever or chills  - no GI or  symptoms    ROS:   CONSTITUTIONAL: NEGATIVE for chills, fatigue, fever,sweats   EYES: NEGATIVE for vision changes or eye pain  ENT/MOUTH: Positive for ear and sinus congestion  RESP: NEGATIVE for cough, hemoptysis, SOB/dyspnea and wheezing  CV: NEGATIVE for chest pain/chest pressure, dyspnea on exertion  GI: NEGATIVE for abdominal pain, diarrhea, dysphagia and nausea  NEURO: NEGATIVE for dizziness/lightheadedness  HEME/ALLERGY/IMMUNE: NEGATIVE for swollen nodes  PSYCHIATRIC: NEGATIVE    Today's PHQ-2:  PHQ-2 ( 1999 Pfizer) 10/16/2019 2/4/2019   Q1: Little interest or pleasure in doing things 0 0   Q2: Feeling down, depressed or hopeless 0 0   PHQ-2 Score 0 0       No past medical history on file.  Past Surgical History:   Procedure Laterality Date     C LEVONORGESTREL-RELEASE INTRAUTERINE CONTRACEPTIVE (JONEL), 13.5 MG  4/2016    Jonel IUS, change in 3 yrs     Family History   Problem Relation Age of Onset     Heart Disease Maternal Grandmother      Hypertension Maternal Grandmother      Heart Disease Maternal Grandfather      Hypertension Maternal Grandfather      Hyperlipidemia Paternal Grandmother      Pancreatic Cancer Paternal Grandmother      Hyperlipidemia Paternal Grandfather      Hypertension Paternal Uncle      Lymphoma Maternal Uncle      Migraines Mother      Clotting Disorder Maternal Uncle         Factor V Leiden     Social " History     Tobacco Use     Smoking status: Never Smoker     Smokeless tobacco: Never Used   Substance Use Topics     Alcohol use: Yes     Alcohol/week: 0.0 standard drinks     Comment: 4/week     Drug use: No     Social History     Patient does not qualify to have social determinant information on file (likely too young).   Social History Narrative     Not on file       Current Outpatient Medications   Medication     Ibuprofen (ADVIL PO)     Levonorgestrel (JONEL IU)     No current facility-administered medications for this visit.      I have reviewed the patient's past medical, surgical, family, and social history.     OBJECTIVE:   /79 (BP Location: Right arm, Patient Position: Sitting, Cuff Size: Adult Regular)   Pulse 69   Temp 98.5  F (36.9  C) (Oral)   Resp 16   Wt 63.6 kg (140 lb 4 oz)   SpO2 100%   BMI 22.35 kg/m      Constitutional: well-appearing, appears stated age  ENT: Middle ears clear of cerumen, TMs appear clear and non-bulging bilaterally with no evidence of drainage  Cardiac: regular rate and rhythm, normal S1/S2, no murmur/rubs/gallops  Respiratory: lungs clear to auscultation bilaterally, normal work of breathing, no wheezes/crackles  Skin: no rashes, lesions, or wounds  Psych: affect is full and appropriate, speech is fluent and non-pressured    ASSESSMENT AND PLAN:     Katie was seen today for ear pressure.    Diagnoses and all orders for this visit:    Ear congestion, bilateral    Low suspicion for bacterial infection based on history and presentation. Discussed symptom management as noted in patient instructions including increased fluids, rest, Flonase, Afrin, Sudafed and steam. Advised patient of concerning symptoms indicating need for urgent reassessment as noted in patient instructions.      Dom Rojas MD  [facility]  10/16/2019, 10:49 AM

## 2019-10-16 NOTE — PATIENT INSTRUCTIONS
Patient Education     Sinusitis (No Antibiotics)    The sinuses are air-filled spaces within the bones of the face. They connect to the inside of the nose. Sinusitis is an inflammation of the tissue that lines the sinuses. Sinusitis can occur during a cold. It can also happen due to allergies to pollens and other particles in the air. It can cause symptoms such as sinus congestion, headache, sore throat, facial swelling, and a feeling of fullness. It may also cause a low-grade fever. Your sinusitis does not include an infection with bacteria. Because of this, antibiotics are not used to treat this problem.  Home care    Drink plenty of water, hot tea, and other liquids. This may help thin nasal mucus. It also may help your sinuses drain fluids.    Heat may help soothe painful areas of your face. Use a towel soaked in hot water. Or,  the shower and direct the warm spray onto your face. Using a vaporizer along with a menthol rub at night may also help soothe symptoms.     An expectorant with guaifenesin may help thin nasal mucus and help your sinuses drain fluids.    You can use an over-the-counter decongestant, unless a similar medicine was prescribed to you. Nasal sprays work the fastest. Use one that contains phenylephrine or oxymetazoline. First blow your nose gently. Then use the spray. Do not use these medicines more often than directed on the label. If you do, your symptoms may get worse. You may also take pills that contain pseudoephedrine. Don t use products that combine multiple medicines. This is because side effects may be increased. Read all medicine labels. You can also ask the pharmacist for help. (People with high blood pressure should not use decongestants. They can raise blood pressure.)    Over-the-counter antihistamines may help if allergies contributed to your sinusitis.      Use acetaminophen or ibuprofen to control pain, unless another pain medicine was prescribed to you. If you have  chronic liver or kidney disease or ever had a stomach ulcer, talk with your healthcare provider before using these medicines. (Aspirin should never be taken by anyone under age 18 who is ill with a fever. It may cause severe liver damage.)    Use nasal rinses or irrigation as instructed by your healthcare provider.    Don't smoke. This can make symptoms worse.  Follow-up care  Follow up with your healthcare provider or our staff if you are NOT better in 1 week.  When to seek medical advice  Call your healthcare provider if any of these occur:    Green or yellow fluid draining from your nose or into your throat    Facial pain or headache that gets worse    Stiff neck    Unusual drowsiness or confusion    Swelling of your forehead or eyelids    Vision problems, such as blurred or double vision    Fever of 100.4 F (38 C) or higher, or as directed by your healthcare provider    Seizure    Breathing problems    Symptoms that don't go away in 10 days  Date Last Reviewed: 11/1/2017 2000-2018 [email or web address]. [address] [place] [address]. All rights reserved. This information is not intended as a substitute for professional medical care. Always follow your healthcare professional's instructions.         Patient Education     Pseudoephedrine tablets  Brand Names: Genaphed, NASAL Decongestant, Nexafed, Pseudo-Time, Sudafed, Sudafed Congestion, Sudogest, Zephrex-D  What is this medicine?  PSEUDOEPHEDRINE (kim latham e FED rin) is a decongestant. It is used to treat congestion of the nose or sinuses.  How should I use this medicine?  Take this medicine by mouth with a glass of water. Follow the directions on the package or prescription label. Take your medicine at regular intervals. Do not take your medicine more often than directed.  Talk to your pediatrician regarding the use of this medicine in children. While this drug may be prescribed for children as young as 6 years of age for selected conditions,  precautions do apply.  Patients over 65 years old may have a stronger reaction and need a smaller dose.  What side effects may I notice from receiving this medicine?  Side effects that you should report to your doctor or health care professional as soon as possible:    allergic reactions like skin rash, itching or hives, swelling of the face, lips, or tongue    bloody diarrhea with stomach pain    breathing problems    chest pain    confused, agitated, nervous    fast, irregular heartbeat    feeling faint or lightheaded, falls    hallucinations    high blood pressure    pain, tingling, numbness in the hands or feet    trouble passing urine or change in the amount of urine    trouble sleeping  Side effects that usually do not require medical attention (report to your doctor or health care professional if they continue or are bothersome):    headache    loss of appetite    nausea, stomach upset  What may interact with this medicine?  Do not take this medicine with any of the following medications:    bromocriptine    ergot alkaloids like dihydroergotamine, ergonovine, ergotamine, methylergonovine    MAOIs like Carbex, Eldepryl, Marplan, Nardil, and Parnate    stimulant medicines for attention disorders, weight loss, or to stay awake  This medicine may also interact with the following medications:    alcohol    atropine    bretylium    caffeine    digoxin    linezolid    mecamylamine    medicines for blood pressure    medicines for depression, anxiety, or psychotic disturbances like fluoxetine, sertraline    medicines for enlarged prostate    medicines for sleep    other medicines for cold, cough, or allergy    procarbazine    reserpine    some heart medicines like metoprolol    Delmar's Wort  What if I miss a dose?  If you miss a dose, take it as soon as you can. If it is almost time for your next dose, take only that dose. Do not take double or extra doses.  Where should I keep my medicine?  Keep out of the reach  of children.  This medicine may cause accidental overdose and death if taken by other adults, children, or pets. Mix any unused medicine with a substance like cat littler or coffee grounds. Then throw the medicine away in a sealed container like a sealed bag or a coffee can with a lid. Do not use the medicine after the expiration date.  Store at room temperature between 15 and 25 degrees C (59 and 77 degrees F). Protect from heat and moisture.  What should I tell my health care provider before I take this medicine?  They need to know if you have any of the following conditions:    diabetes    glaucoma    heart disease    high blood pressure    kidney disease    prostate trouble    taken an MAOI like Carbex, Eldepryl, Marplan, Nardil, or Parnate in last 14 days    thyroid disease    trouble passing urine    an unusual or allergic reaction to pseudoephedrine, other medicines, foods, dyes, or preservatives    pregnant or trying to get pregnant    breast-feeding  What should I watch for while using this medicine?  Tell your doctor or healthcare professional if your symptoms do not start to get better or if they get worse. See your doctor if you are not better in 7 days or if you have a fever.  NOTE:This sheet is a summary. It may not cover all possible information. If you have questions about this medicine, talk to your doctor, pharmacist, or health care provider. Copyright  2019 [employer]

## 2019-10-16 NOTE — NURSING NOTE
29 year old  Chief Complaint   Patient presents with     Ear Pressure     x several weeks       Blood pressure 114/79, pulse 69, temperature 98.5  F (36.9  C), temperature source Oral, resp. rate 16, weight 63.6 kg (140 lb 4 oz), SpO2 100 %. Body mass index is 22.35 kg/m .  Patient Active Problem List   Diagnosis     Flexural eczema     MTHFR mutation (H)     Tension headache     History of urinary reflux       Wt Readings from Last 2 Encounters:   10/16/19 63.6 kg (140 lb 4 oz)   02/04/19 64.4 kg (142 lb)     BP Readings from Last 3 Encounters:   10/16/19 114/79   02/04/19 109/72   04/06/18 106/74         Current Outpatient Medications   Medication     Ibuprofen (ADVIL PO)     Levonorgestrel (JONEL IU)     No current facility-administered medications for this visit.        Social History     Tobacco Use     Smoking status: Never Smoker     Smokeless tobacco: Never Used   Substance Use Topics     Alcohol use: Yes     Alcohol/week: 0.0 standard drinks     Comment: 4/week     Drug use: No       Health Maintenance Due   Topic Date Due     HIV SCREENING  05/24/2007     PREVENTIVE CARE VISIT  11/21/2017     INFLUENZA VACCINE (1) 09/01/2019     PAP  11/21/2019       Lab Results   Component Value Date    PAP NIL 11/21/2016 October 16, 2019 10:47 AM

## 2019-12-05 ENCOUNTER — OFFICE VISIT (OUTPATIENT)
Dept: FAMILY MEDICINE | Facility: CLINIC | Age: 29
End: 2019-12-05
Payer: COMMERCIAL

## 2019-12-05 ENCOUNTER — NURSE TRIAGE (OUTPATIENT)
Dept: NURSING | Facility: CLINIC | Age: 29
End: 2019-12-05

## 2019-12-05 VITALS
BODY MASS INDEX: 21.27 KG/M2 | HEIGHT: 67 IN | SYSTOLIC BLOOD PRESSURE: 120 MMHG | WEIGHT: 135.5 LBS | DIASTOLIC BLOOD PRESSURE: 88 MMHG | RESPIRATION RATE: 18 BRPM | OXYGEN SATURATION: 96 % | HEART RATE: 120 BPM | TEMPERATURE: 100.6 F

## 2019-12-05 DIAGNOSIS — R50.9 PERSISTENT FEVER: Primary | ICD-10-CM

## 2019-12-05 DIAGNOSIS — R05.9 COUGH: ICD-10-CM

## 2019-12-05 RX ORDER — AZITHROMYCIN 250 MG/1
TABLET, FILM COATED ORAL
Qty: 6 TABLET | Refills: 0 | Status: SHIPPED | OUTPATIENT
Start: 2019-12-05 | End: 2020-02-10

## 2019-12-05 ASSESSMENT — MIFFLIN-ST. JEOR: SCORE: 1381.75

## 2019-12-05 NOTE — NURSING NOTE
"29 year old  Chief Complaint   Patient presents with     Fever     fever and headache x 5 day, cough start 2 days ago        Blood pressure 120/88, pulse 120, temperature 100.6  F (38.1  C), temperature source Oral, resp. rate 18, height 1.701 m (5' 6.97\"), weight 61.5 kg (135 lb 8 oz), last menstrual period 11/22/2019, SpO2 96 %. Body mass index is 21.24 kg/m .  Patient Active Problem List   Diagnosis     Flexural eczema     MTHFR mutation (H)     Tension headache     History of urinary reflux       Wt Readings from Last 2 Encounters:   12/05/19 61.5 kg (135 lb 8 oz)   10/16/19 63.6 kg (140 lb 4 oz)     BP Readings from Last 3 Encounters:   12/05/19 120/88   10/16/19 114/79   02/04/19 109/72         Current Outpatient Medications   Medication     Ibuprofen (ADVIL PO)     Levonorgestrel (JONEL IU)     No current facility-administered medications for this visit.        Social History     Tobacco Use     Smoking status: Never Smoker     Smokeless tobacco: Never Used   Substance Use Topics     Alcohol use: Yes     Alcohol/week: 0.0 standard drinks     Comment: 4/week     Drug use: No       Health Maintenance Due   Topic Date Due     HIV SCREENING  05/24/2007     PREVENTIVE CARE VISIT  11/21/2017     INFLUENZA VACCINE (1) 09/01/2019     PAP  11/21/2019       Lab Results   Component Value Date    PAP NIL 11/21/2016 December 5, 2019 2:24 PM    "

## 2019-12-05 NOTE — PATIENT INSTRUCTIONS
Patient Education     Treating Pneumonia  Pneumonia is an infection of one or both of the lungs. Pneumonia:    Is usually caused by either a virus or a bacteria    Can be very serious, especially in infants, young children, and older adults. It s also serious for those with other long-term health problems or weakened immune systems.    Is sometimes treated at home and sometimes in the hospital    Antibiotic medicines  Antibiotics may be prescribed for pneumonia caused by bacteria. They may be pills (oral medicines), or shots (injections). Or they may be given by IV (intravenously) into a vein. If you are taking oral medicines at home:    Fill your prescription and start taking your medicine as soon as you can.    You will likely start to feel better in a day or 2, but don t stop taking the antibiotic.    Use a pill organizer to help you remember to take your medicine.    Let your healthcare provider know if you have side effects.    Take your medicine exactly as directed on the label. Talk to your provider or pharmacist if you have any questions.  Antiviral medicines  Antiviral medicine may be prescribed for pneumonia caused by a virus. For example, antiviral medicine may be prescribed for pneumonia caused by the flu virus. Antibiotics do not work against viruses. If you are taking antiviral medicine at home:    Fill your prescription and start taking your medicine as soon as you can.    Talk with your provider or pharmacist about possible side effects.    Take the medicine exactly as instructed.  To relieve symptoms  There are many medicines that can help relieve symptoms of pneumonia. Some are prescription and some are over-the-counter.  Your healthcare provider may recommend:    Acetaminophen or ibuprofen to lower your fever and to lessen headache or other pain    Cough medicine to loosen mucus or to reduce coughing  Make sure you check with your healthcare provider or pharmacist before taking any  over-the-counter medicines.  Special treatments  If you are hospitalized for pneumonia, you may have other therapies, including:    Inhaled medicines to help with breathing or chest congestion    Supplemental oxygen to increase low oxygen levels  Drink fluids and eat healthy  You should eat healthy to help your body fight the infection. Drinking a lot of fluids helps to replace fluids lost from fever and to loosen mucus in your chest.    Diet. Make healthy food choices, including fruits and vegetables, lean meats and other proteins, 100% whole grain and low- or no-fat dairy products.    Fluids. Drink at least 6 to 8 tall glasses a day. Water and 100% fruit or vegetable juice are best.  Get plenty of rest and sleep  You may be more tired than usual for a while. It is important to get enough sleep at night. It s also important to rest during the day. Talk with your healthcare provider if coughing or other symptoms are interfering with your sleep.  Preventing the spread of germs  The best thing you can do to prevent spreading germs is to wash your hands often. You should:    Rub your hand with soap and water for 20 to 30 seconds.    Clean in between your fingers, the backs of your hands, and around your nails.    Dry your hands on a separate towel or use paper towels.  You should also:    Keep alcohol-based hand  nearby.    Make sure you also clean surfaces that you touch. Use a product that kills all types of germs.    Stay away from others until you are feeling better.  When to call your healthcare provider  Call your healthcare provider if you have any of the following:    Symptoms get worse    Fever continues    Shortness of breath gets worse    Increased mucus or mucus that is darker in color    Coughing gets worse    Lips or fingers are bluish in color    Side effects from your medicine   Date Last Reviewed: 12/1/2016 2000-2018 [employer]. [address] [place] [address], [place] [address]. All  rights reserved. This information is not intended as a substitute for professional medical care. Always follow your healthcare professional's instructions.

## 2019-12-05 NOTE — PROGRESS NOTES
SUBJECTIVE:   Katie Means is a 29 year old female who presents to clinic today to discuss the following problem(s).    Fever, headache, developing cough  - felt a little under on Saturday  - fever and headache by Sunday night  - without Advil temp up to 101.6  - fever has been persistent for the past 5 days  - has been taking advil regularly  - cough started on Tuesday  - otherwise negative ROS as noted below    ROS:   CONSTITUTIONAL: Chills, fatigue, fever,sweats  ENT/MOUTH: Minimal nasal congestion, postnasal drainage and rhinorrhea  RESP: Cough, hemoptysis, SOB/dyspnea and wheezing  CV: NEGATIVE for chest pain/chest pressure, dyspnea on exertion  GI: NEGATIVE for abdominal pain, diarrhea, dysphagia and nausea  : NEGATIVE for dysuria, frequency and hematuria  MUSCULOSKELETAL: Mild body aches  INTEGUMENTARY/SKIN: NEGATIVE for new lesions and pruritis  NEURO: Headache. NEGATIVE for dizziness/lightheadedness, gait disturbance, memory problems and paresthesias   PSYCHIATRIC: NEGATIVE     Today's PHQ-2:  PHQ-2 ( 1999 Pfizer) 12/5/2019 10/16/2019   Q1: Little interest or pleasure in doing things 0 0   Q2: Feeling down, depressed or hopeless 0 0   PHQ-2 Score 0 0       History reviewed. No pertinent past medical history.  Past Surgical History:   Procedure Laterality Date     C LEVONORGESTREL-RELEASE INTRAUTERINE CONTRACEPTIVE (JONEL), 13.5 MG  4/2016    Jonel IUS, change in 3 yrs     Family History   Problem Relation Age of Onset     Heart Disease Maternal Grandmother      Hypertension Maternal Grandmother      Heart Disease Maternal Grandfather      Hypertension Maternal Grandfather      Hyperlipidemia Paternal Grandmother      Pancreatic Cancer Paternal Grandmother      Hyperlipidemia Paternal Grandfather      Hypertension Paternal Uncle      Lymphoma Maternal Uncle      Migraines Mother      Clotting Disorder Maternal Uncle         Factor V Leiden     Social History     Tobacco Use     Smoking status: Never Smoker  "    Smokeless tobacco: Never Used   Substance Use Topics     Alcohol use: Yes     Alcohol/week: 0.0 standard drinks     Comment: 4/week     Drug use: No     Social History     Social History Narrative     Not on file       Current Outpatient Medications   Medication     Ibuprofen (ADVIL PO)     Levonorgestrel (JONEL IU)     No current facility-administered medications for this visit.      I have reviewed the patient's past medical, surgical, family, and social history.     OBJECTIVE:   /88 (BP Location: Right arm, Patient Position: Sitting, Cuff Size: Adult Regular)   Pulse 120   Temp 100.6  F (38.1  C) (Oral)   Resp 18   Ht 1.701 m (5' 6.97\")   Wt 61.5 kg (135 lb 8 oz)   LMP 11/22/2019   SpO2 96%   BMI 21.24 kg/m      Constitutional: well-appearing, appears stated age  Eyes: conjunctivae without erythema, sclera anicteric.   ENT: mild lymphadenopathy, tonsilar erythema, no visible posterior drainage  Cardiac: regular rate and rhythm, normal S1/S2, no murmur/rubs/gallops  Respiratory: cough, lungs clear to auscultation bilaterally, normal work of breathing, no wheezes/crackles  Skin: general warmth, no rashes, lesions, or wounds  Psych: affect is full and appropriate, speech is fluent and non-pressured    ASSESSMENT AND PLAN:     Katie was seen today for fever.    Diagnoses and all orders for this visit:    Persistent fever  -     azithromycin (ZITHROMAX) 250 MG tablet; Take 2 tablets (500 mg) by mouth daily for 1 day, THEN 1 tablet (250 mg) daily for 4 days.    Cough    Given duration of fever I am concerned for ongoing bacterial infection. Will treat for suspect lower respiratory infection although I was unable to get a chest XR at this time as there is not an XR technician available at this time.    Discussed symptom management and advised patient of concerning symptoms indicating a need for further medical advice as noted in patient instructions.       Dom Rojas MD  [facility] " Clinic  12/05/2019, 2:27 PM

## 2019-12-05 NOTE — TELEPHONE ENCOUNTER
"    Additional Information    Negative: Difficult to awaken or acting confused (e.g., disoriented, slurred speech)    Negative: Pale cold skin and very weak (can't stand)    Negative: Difficulty breathing and bluish (or gray) lips or face    Negative: New onset rash with purple (or blood-colored) spots or dots    Negative: Sounds like a life-threatening emergency to the triager    Negative: Fever onset within 24 hours of receiving vaccine    Negative: Fever within 21 days of Ebola EXPOSURE    Negative: Headache and stiff neck (can't touch chin to chest)    Negative: Difficulty breathing    Negative: IV drug abuse    Negative: Fever > 103 F (39.4 C)    Negative: Fever > 101 F (38.3 C) and over 60 years of age    Negative: Fever > 100.0 F (37.8 C) and diabetes mellitus or a weak immune system (e.g., HIV positive, chemotherapy, splenectomy)    Negative: Fever > 100.0 F (37.8 C) and bedridden (e.g., nursing home patient, stroke, chronic illness, recovering from surgery)    Negative: Fever > 100.0 F (37.8 C) and indwelling urinary catheter (e.g., Ennis, coude)    Negative: Fever > 100.5 F (38.1 C) and has port (portacath), central line, or PICC line    Negative: Drinking very little and has signs of dehydration (e.g., no urine > 12 hours, very dry mouth, very lightheaded)    Negative: Patient sounds very sick or weak to the triager    Negative: Fever > 100.5 F (38.1 C) and surgery in the past month    Negative: Transplant patient (e.g., liver, heart, lung, kidney)    Negative: Widespread rash and cause unknown    Negative: Patient wants to be seen    Fever present > 3 days (72 hours)    Answer Assessment - Initial Assessment Questions  1. TEMPERATURE: \"What is the most recent temperature?\"  \"How was it measured?\"        orally  2. ONSET: \"When did the fever start?\"       Today is the 5th day  3. SYMPTOMS: \"Do you have any other symptoms besides the fever?\"  (e.g., colds, headache, sore throat, earache, cough, rash, " "diarrhea, vomiting, abdominal pain)      Headache and cough  4. CAUSE: If there are no symptoms, ask: \"What do you think is causing the fever?\"       ?  5. CONTACTS: \"Does anyone else in the family have an infection?\"      no  6. TREATMENT: \"What have you done so far to treat this fever?\" (e.g., medications)      Advil, tylenol  7. IMMUNOCOMPROMISE: \"Do you have of the following: diabetes, HIV positive, splenectomy, cancer chemotherapy, chronic steroid treatment, transplant patient, etc.\"      no  8. PREGNANCY: \"Is there any chance you are pregnant?\" \"When was your last menstrual period?\"      no  9. TRAVEL: \"Have you traveled out of the country in the last month?\" (e.g., travel history, exposures)      no    Protocols used: FEVER-A-OH      "

## 2019-12-16 ENCOUNTER — OFFICE VISIT (OUTPATIENT)
Dept: FAMILY MEDICINE | Facility: CLINIC | Age: 29
End: 2019-12-16
Payer: COMMERCIAL

## 2019-12-16 VITALS
HEIGHT: 67 IN | OXYGEN SATURATION: 98 % | TEMPERATURE: 98.3 F | HEART RATE: 92 BPM | SYSTOLIC BLOOD PRESSURE: 124 MMHG | DIASTOLIC BLOOD PRESSURE: 88 MMHG | RESPIRATION RATE: 17 BRPM | BODY MASS INDEX: 21.66 KG/M2 | WEIGHT: 138 LBS

## 2019-12-16 DIAGNOSIS — J40 BRONCHITIS: Primary | ICD-10-CM

## 2019-12-16 RX ORDER — ALBUTEROL SULFATE 90 UG/1
2 AEROSOL, METERED RESPIRATORY (INHALATION) EVERY 4 HOURS PRN
Qty: 1 INHALER | Refills: 0 | Status: SHIPPED | OUTPATIENT
Start: 2019-12-16 | End: 2020-02-10

## 2019-12-16 ASSESSMENT — MIFFLIN-ST. JEOR: SCORE: 1393.09

## 2019-12-16 NOTE — PATIENT INSTRUCTIONS
Katie is a 30 yo with a post-URI cough. Symptoms of fever improved after Azithromax, but cough has persisted.    PLAN  Discussed symptomatic cares with fluids, humidifier and OTC cough meds  Also, Rx for Albuterol inhaler. Videos on technique described.   Try Naprosyn twice daily with food for a few days.   Let us know if symptoms do not improve

## 2019-12-16 NOTE — PROGRESS NOTES
"REASON FOR VISIT:  Persistent Cough      HISTORY OF PRESENT ILLNESS: Katie Means is a 29 year old female who presents for a persistent cough.  I have met her twice since 2016, most recently in 02/2019 for generalized body aches. [place] [facility] [place] Dr. Rojas, with a persistent fever and a developing headache and cough.  She was given azithromycin for 5 days, which resolved her fever, but her cough was never resolved.  She took her last dose of azithromycin on 12/09/2019, and since then, her cough has become more frequent, specifically in the past few days.  Her fever has not returned.  She has been taking Delsym at night to treat her cough.  She has some upcoming travel, and wanted to return to the clinic before leaving town on 12/20/2019 for 10 days.        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     Current Outpatient Medications   Medication Sig Dispense Refill     albuterol (PROVENTIL HFA) 108 (90 Base) MCG/ACT inhaler Inhale 2 puffs into the lungs every 4 hours as needed for shortness of breath / dyspnea or wheezing 1 Inhaler 0     Ibuprofen (ADVIL PO) Take by mouth as needed for moderate pain       Levonorgestrel (JONEL IU)            REVIEW OF SYSTEMS:  POSITIVE for persistent cough.  Fever has resolved. Otherwise, the ROS is negative.      OBJECTIVE:      EXAM: Katie is a 29 year old female, with a cough, who appears to be otherwise healthy.  VITAL SIGNS: /88   Pulse 92   Temp 98.3  F (36.8  C) (Oral)   Resp 17   Ht 1.701 m (5' 6.97\")   Wt 62.6 kg (138 lb)   LMP 11/22/2019   SpO2 98%   Breastfeeding No   BMI 21.63 kg/m    Constitutional: healthy, alert and no distress   Cardiovascular: negative, PMI normal. No lifts, heaves, or " thrills. RRR. No murmurs, clicks gallops or rub  Respiratory: Lungs clear bilaterally   Neck: Neck supple. No adenopathy.  ENT: Ear and throat exam normal, no neck nodes or sinus tenderness. Nasal mucosa slightly congested.        ASSESSMENT AND PLAN:   1. Katie is a 28 yo with a post-URI cough. Symptoms of fever improved after Azithromax, but cough has persisted.     PLAN  Discussed symptomatic cares with fluids, humidifier and OTC cough meds  Also, Rx for Albuterol inhaler. Videos on technique described.   Try Naprosyn twice daily with food for a few days.   Let us know if symptoms do not improve       Call with any problems or questions.          I, Joe Neff, am serving as a scribe to document services personally performed by Ravin King MD based on data collection and the provider's statements to me. Dr. King has reviewed, edited and approved the above note.     --Ravin King MD

## 2019-12-16 NOTE — NURSING NOTE
"29 year old  Chief Complaint   Patient presents with     Cough     was seen a few weeks ago and put on antibiotics that did help some but cough has got more frequent in the last few days. Pt is going out of town on Friday.       Blood pressure 124/88, pulse 92, temperature 98.3  F (36.8  C), temperature source Oral, resp. rate 17, height 1.701 m (5' 6.97\"), weight 62.6 kg (138 lb), last menstrual period 11/22/2019, SpO2 98 %, not currently breastfeeding. Body mass index is 21.63 kg/m .  Patient Active Problem List   Diagnosis     Flexural eczema     MTHFR mutation (H)     Tension headache     History of urinary reflux       Wt Readings from Last 2 Encounters:   12/16/19 62.6 kg (138 lb)   12/05/19 61.5 kg (135 lb 8 oz)     BP Readings from Last 3 Encounters:   12/16/19 124/88   12/05/19 120/88   10/16/19 114/79         Current Outpatient Medications   Medication     Ibuprofen (ADVIL PO)     Levonorgestrel (JONEL IU)     No current facility-administered medications for this visit.        Social History     Tobacco Use     Smoking status: Never Smoker     Smokeless tobacco: Never Used   Substance Use Topics     Alcohol use: Yes     Alcohol/week: 0.0 standard drinks     Comment: 4/week     Drug use: No       Health Maintenance Due   Topic Date Due     HIV SCREENING  05/24/2007     PREVENTIVE CARE VISIT  11/21/2017     INFLUENZA VACCINE (1) 09/01/2019     PAP  11/21/2019       Lab Results   Component Value Date    PAP NIL 11/21/2016 December 16, 2019 4:32 PM  "

## 2020-01-28 DIAGNOSIS — J40 BRONCHITIS: ICD-10-CM

## 2020-01-28 RX ORDER — ALBUTEROL SULFATE 90 UG/1
AEROSOL, METERED RESPIRATORY (INHALATION)
Qty: 18 INHALER | Refills: 0 | OUTPATIENT
Start: 2020-01-28

## 2020-01-28 NOTE — TELEPHONE ENCOUNTER
I spoke to patient, she was given albuterol inhaler for bronchitis acute problem and no longer needs this. She does not want this inhaler refilled.     Julia Lara RN  01/28/20  3:13 PM

## 2020-02-07 NOTE — PROGRESS NOTES
Katie Means is here for a general check up. She is not fasting. She is due for a dental visit. No visual concerns. Wears seat belt-yes. Bike helmet- n/a. Concerns today:    HCM  Katie is a 29 year old female here for a general check up. Due for Tdap and flu vaccines, declines. She is due for a pap. Last pap was in 2016 (no co-testing) and was normal. Diet is healthy with fish and vegetables. She does not eat red meat. No history of anemia.     Health Maintenance   Topic Date Due     PAP  11/21/2019     DTAP/TDAP/TD IMMUNIZATION (2 - Td) 08/20/2020     PREVENTIVE CARE VISIT  02/10/2021     HIV SCREENING  Completed     PHQ-2  Completed     INFLUENZA VACCINE  Addressed     IPV IMMUNIZATION  Aged Out     MENINGITIS IMMUNIZATION  Aged Out         Patient Active Problem List   Diagnosis     Flexural eczema     MTHFR mutation (H)     Tension headache     History of urinary reflux       Past Surgical History:   Procedure Laterality Date     C LEVONORGESTREL-RELEASE INTRAUTERINE CONTRACEPTIVE (JONEL), 13.5 MG  4/2016    Jonel IUS, change in 3 yrs     REMOVE INTRAUTERINE DEVICE      jonel       Family History   Problem Relation Age of Onset     Heart Disease Maternal Grandmother      Hypertension Maternal Grandmother      Heart Disease Maternal Grandfather      Hypertension Maternal Grandfather      Hyperlipidemia Paternal Grandmother      Pancreatic Cancer Paternal Grandmother      Hyperlipidemia Paternal Grandfather      Hypertension Paternal Uncle      Lymphoma Maternal Uncle      Migraines Mother      Clotting Disorder Maternal Uncle         Factor V Leiden       Social  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 Vitamin D and calcium recommendations were reviewed and information sheet was given.   Caffeine: none  Exercise: Runner yoga 3x per week    OB/GYN HISTORY:  LMP: Patient's last menstrual period was 02/05/2020.  Periods are regular, 5 days of  "bleeding  Hx abnormal pap? no  STD hx? none  cycle length: monthly  dysmenorrhea/PMS: none  Vasomotor sx:  none  Contraception: none, female partner  G 0   P 0   A 0  Self Breast exam:  yes    Current Outpatient Medications   Medication Sig Dispense Refill     Ibuprofen (ADVIL PO) Take by mouth as needed for moderate pain       Allergies   Allergen Reactions     No Clinical Screening - See Comments Nausea and Vomiting     Shell Fish          ROS  CONSTITUTIONAL:NEGATIVE for fever, chills, change in weight  INTEGUMENTARY/SKIN: NEGATIVE for worrisome rashes, moles or lesions  EYES: NEGATIVE for vision changes or irritation  ENT/MOUTH: NEGATIVE for ear, mouth and throat problems  RESP:NEGATIVE for significant cough or SOB  BREAST: NEGATIVE for masses, tenderness or discharge  CV: NEGATIVE for chest pain, palpitations, PINK, orthopnea, PND  or peripheral edema  GI: NEGATIVE for nausea, abdominal pain, heartburn, or change in bowel habits  :NEGATIVE for frequency, dysuria, or hematuria  MUSCULOSKELETAL:NEGATIVE for significant arthralgias or myalgia  NEURO: NEGATIVE for weakness, dizziness or paresthesias  ENDOCRINE: NEGATIVE for polyuria/dipsia,  temperature intolerance, skin/hair changes  HEME/ALLERGY/IMMUNE: NEGATIVE for bleeding problems  PSYCHIATRIC: NEGATIVE for changes in mood or affect    EXAM  /75 (BP Location: Left arm, Patient Position: Sitting, Cuff Size: Adult Regular)   Pulse 74   Temp 97.8  F (36.6  C) (Oral)   Resp 16   Ht 1.68 m (5' 6.14\")   Wt 62.1 kg (137 lb)   LMP 02/05/2020   SpO2 98%   BMI 22.02 kg/m    GENERAL APPEARANCE: Alert, pleasant, NAD  EYES: PERRL, EOMI, conjunctiva clear  HENT: TM normal bilaterally. Nose and mouth without lesions  NECK: no adenopathy, thyroid normal to palpation. No carotid bruits.   RESP: lungs clear to auscultation bilaterally, no rhonchi, wheezes or rales   BREAST: normal without masses, no tenderness or nipple discharge and no palpable  axillary masses " or adenopathy   CV: regular rate and rhythm, normal S1 S2, no murmur, no carotid bruits  ABDOMEN: soft, nontender, without HSM or masses. Bowel sounds normal  : External genitalia without lesions, cervix normal, pap easily obtained, no abnormal discharge, bimanual exam without adnexal masses or tenderness, uterus non enlarged  RECTAL EXAM: not done   MS: extremities normal- no gross deformities noted, no tender, hot or swollen joints.    SKIN: no suspicious lesions or rashes  NEURO: Normal strength and tone, sensory exam grossly normal, DTR normoreflexive in upper and lower extremities  PSYCH: mentation appears normal. and affect normal/bright.  EXT: no peripheral edema, pedal pulses palpable    Assessment:  (Z00.00) Routine general medical examination at a health care facility  (primary encounter diagnosis)  Comment: Healthy 29 year old female. Declines flu and Tdap vaccines.   Plan: Comprehensive Metabolic Panel ([facility]), Vitamin        D Deficiency, Vitamin B12        Anticipatory guidance given today regarding diet, exercise, calcium intake.     (Z12.4) Screening for cervical cancer  Comment: Due for routine pap  Plan: Pap imaged thin layer screen reflex to HPV if         ASCUS - recommend age 25 - 29            (Z13.220) Screening for lipid disorders  Comment: Routine screening, not fasting  Plan: Lipid Panel ([facility])            (N92.0) Menorrhagia with regular cycle  Comment: 5 days of bleeding  Plan: CBC with Diff Plt ([facility]), Ferritin        Check for anemia      Steph Lowe MD  Internal Medicine/Pediatrics    I, Giles Spencer, am serving as a scribe to document services personally performed by Dr. Steph Lowe, based on data collection and the provider's statements to me. Dr. Lowe has reviewed, edited and approved the above note.

## 2020-02-10 ENCOUNTER — OFFICE VISIT (OUTPATIENT)
Dept: FAMILY MEDICINE | Facility: CLINIC | Age: 30
End: 2020-02-10
Payer: COMMERCIAL

## 2020-02-10 VITALS
BODY MASS INDEX: 22.02 KG/M2 | OXYGEN SATURATION: 98 % | RESPIRATION RATE: 16 BRPM | SYSTOLIC BLOOD PRESSURE: 113 MMHG | TEMPERATURE: 97.8 F | HEART RATE: 74 BPM | WEIGHT: 137 LBS | HEIGHT: 66 IN | DIASTOLIC BLOOD PRESSURE: 75 MMHG

## 2020-02-10 DIAGNOSIS — Z13.220 SCREENING FOR LIPID DISORDERS: ICD-10-CM

## 2020-02-10 DIAGNOSIS — Z12.4 SCREENING FOR CERVICAL CANCER: ICD-10-CM

## 2020-02-10 DIAGNOSIS — N92.0 MENORRHAGIA WITH REGULAR CYCLE: ICD-10-CM

## 2020-02-10 DIAGNOSIS — Z00.00 ROUTINE GENERAL MEDICAL EXAMINATION AT A HEALTH CARE FACILITY: Primary | ICD-10-CM

## 2020-02-10 LAB
% GRANULOCYTES: 49.3 %G (ref 40–75)
ALBUMIN SERPL-MCNC: 3.9 G/DL (ref 3.3–4.6)
ALP SERPL-CCNC: 58 U/L (ref 40–150)
ALT SERPL-CCNC: 41 U/L (ref 0–50)
AST SERPL-CCNC: 35 U/L (ref 0–45)
BILIRUB SERPL-MCNC: 0.4 MG/DL (ref 0.2–1.3)
BUN SERPL-MCNC: 13 MG/DL (ref 5–24)
CALCIUM SERPL-MCNC: 9.5 MG/DL (ref 8.5–10.4)
CHLORIDE SERPLBLD-SCNC: 103 MMOL/L (ref 94–109)
CHOLEST SERPL-MCNC: 239 MG/DL (ref 0–200)
CHOLEST/HDLC SERPL: 2.8 {RATIO} (ref 0–5)
CO2 SERPL-SCNC: 30 MMOL/L (ref 20–32)
CREAT SERPL-MCNC: 0.6 MG/DL (ref 0.6–1.3)
EGFR CALCULATED (BLACK REFERENCE): 154.2
EGFR CALCULATED (NON BLACK REFERENCE): 127.5
ERYTHROCYTE [DISTWIDTH] IN BLOOD BY AUTOMATED COUNT: 13.3 %
FASTING SPECIMEN: NO
FERRITIN SERPL-MCNC: 9 NG/ML (ref 12–150)
GLUCOSE SERPL-MCNC: 95 MG/DL (ref 60–99)
GRANULOCYTES #: 1.6 K/UL (ref 1.6–8.3)
HCT VFR BLD AUTO: 39.2 % (ref 35–47)
HDLC SERPL-MCNC: 84 MG/DL
HEMOGLOBIN: 13 G/DL (ref 11.7–15.7)
LDLC SERPL CALC-MCNC: 142 MG/DL (ref 0–129)
LYMPHOCYTES # BLD AUTO: 1.4 K/UL (ref 0.8–5.3)
LYMPHOCYTES NFR BLD AUTO: 40.3 %L (ref 20–48)
MCH RBC QN AUTO: 29.6 PG (ref 26.5–35)
MCHC RBC AUTO-ENTMCNC: 33.2 G/DL (ref 32–36)
MCV RBC AUTO: 89.3 FL (ref 78–100)
MID #: 0.4 K/UL (ref 0–2.2)
MID %: 10.4 %M (ref 0–20)
PLATELET # BLD AUTO: 223 K/UL (ref 150–450)
POTASSIUM SERPL-SCNC: 3.9 MMOL/L (ref 3.4–5.3)
PROT SERPL-MCNC: 7.2 G/DL (ref 6.8–8.8)
RBC # BLD AUTO: 4.39 M/UL (ref 3.8–5.2)
SODIUM SERPL-SCNC: 137 MMOL/L (ref 137.3–146.3)
TRIGL SERPL-MCNC: 62 MG/DL (ref 0–150)
VIT B12 SERPL-MCNC: 492 PG/ML (ref 193–986)
VLDL-CHOLESTEROL: 12 (ref 7–32)
WBC # BLD AUTO: 3.4 K/UL (ref 4–11)

## 2020-02-10 ASSESSMENT — MIFFLIN-ST. JEOR: SCORE: 1375.43

## 2020-02-10 NOTE — NURSING NOTE
"29 year old  Chief Complaint   Patient presents with     Physical     29 yrs old , non fasting ,        Blood pressure 113/75, pulse 74, temperature 97.8  F (36.6  C), temperature source Oral, resp. rate 16, height 1.68 m (5' 6.14\"), weight 62.1 kg (137 lb), last menstrual period 02/05/2020, SpO2 98 %, not currently breastfeeding. Body mass index is 22.02 kg/m .  Patient Active Problem List   Diagnosis     Flexural eczema     MTHFR mutation (H)     Tension headache     History of urinary reflux       Wt Readings from Last 2 Encounters:   02/10/20 62.1 kg (137 lb)   12/16/19 62.6 kg (138 lb)     BP Readings from Last 3 Encounters:   02/10/20 113/75   12/16/19 124/88   12/05/19 120/88         Current Outpatient Medications   Medication     albuterol (PROVENTIL HFA) 108 (90 Base) MCG/ACT inhaler     Ibuprofen (ADVIL PO)     No current facility-administered medications for this visit.        Social History     Tobacco Use     Smoking status: Never Smoker     Smokeless tobacco: Never Used   Substance Use Topics     Alcohol use: Yes     Alcohol/week: 0.0 standard drinks     Comment: 4/week     Drug use: No       Health Maintenance Due   Topic Date Due     HIV SCREENING  05/24/2007     INFLUENZA VACCINE (1) 09/01/2019     PAP  11/21/2019     PHQ-2  01/01/2020       Lab Results   Component Value Date    PAP NIL 11/21/2016         February 10, 2020 1:50 PM    "

## 2020-02-11 LAB — DEPRECATED CALCIDIOL+CALCIFEROL SERPL-MC: 20 UG/L (ref 20–75)

## 2020-02-12 LAB
COPATH REPORT: NORMAL
PAP: NORMAL

## 2020-05-14 ENCOUNTER — TELEPHONE (OUTPATIENT)
Dept: FAMILY MEDICINE | Facility: CLINIC | Age: 30
End: 2020-05-14

## 2020-05-14 NOTE — TELEPHONE ENCOUNTER
[facility] Call Center    Phone Message    May a detailed message be left on voicemail: yes     Reason for Call: Other: .  does MTHFR blood mutation put pt at high risk for Covid-19?    Action Taken: Message routed to:  [facility]: [facility] [place]    [place] Screening: Not Applicable

## 2020-05-18 NOTE — PROGRESS NOTES
Family Medicine Video Visit Note  Katie Means is being evaluated via a billable video visit.    Consent documented below.  Chief Complaint   Patient presents with     Consult     patient is wondering if MTHFR blood mutation put pt at high risk for Covid-19     RECHECK     with supplments.            HPI     Video Start Time: 8:39 am  THIS is the time provider and patient connect.  8:39 AM start  9:03 AM end    Katie Means is a 29 year old female who presents to the clinic for the following health issues:    MTHFR gene positive  Katie is a 30 yo woman who carries a MTHFR gene. She was negative for Factor V leiden gene. Maternal uncle with factor V leiden gene.  No personal history of clot. She is not taking any supplements, but asks if she should take Folgard. She is concerned that she may be at higher risk to develop a clot if she gets COVID 19. She is wondering if there is something she should take.     Low ferritin  Katie was found to have a low ferritin of 9 at her physical exam. Hgb was 13. She reported monthly menses with 5-6 days of flow. She changes protection roughly 3x per day. She does not eat red meat, eats lentils and green leafy veggies. She denies thinning of hair or fatigue. She has been taking a tablet of ferrous sulfate daily during her menses. We had discussed having her take supplements and then recheck in several months.    Low vitamin D  Katie had a Vitamin D level of 20 when checked in Feb 2020. She has been taking vitamin D 2000 international unit(s) daily since that time. She wonders when to return for recheck.     Patient Active Problem List   Diagnosis     Flexural eczema     MTHFR mutation (H)     Tension headache     History of urinary reflux     Past Surgical History:   Procedure Laterality Date     C LEVONORGESTREL-RELEASE INTRAUTERINE CONTRACEPTIVE (JONEL), 13.5 MG  4/2016    Jonel IUS, change in 3 yrs     REMOVE INTRAUTERINE DEVICE      jonel       Social History     Tobacco Use      Smoking status: Never Smoker     Smokeless tobacco: Never Used   Substance Use Topics     Alcohol use: Yes     Alcohol/week: 0.0 standard drinks     Comment: 4/week     Family History   Problem Relation Age of Onset     Heart Disease Maternal Grandmother      Hypertension Maternal Grandmother      Heart Disease Maternal Grandfather      Hypertension Maternal Grandfather      Hyperlipidemia Paternal Grandmother      Pancreatic Cancer Paternal Grandmother      Hyperlipidemia Paternal Grandfather      Hypertension Paternal Uncle      Lymphoma Maternal Uncle      Migraines Mother      Clotting Disorder Maternal Uncle         Factor V Leiden           Reviewed and updated as needed this visit by Provider              Review of Systems:     Constitutional, HEENT, cardiovascular, pulmonary, gi and gu systems are negative, except as otherwise noted.         Physical Exam:     There were no vitals taken for this visit.    GENERAL: Healthy, alert and no distress  EYES: Eyes grossly normal to inspection, conjunctivae and sclerae normal  RESP: no audible wheeze, cough, or visible cyanosis.  No visible retractions or increased work of breathing.  Able to speak fully in complete sentences.  NEURO: Cranial nerves grossly intact, mentation intact and speech normal  PSYCH: mentation appears normal, affect normal/bright, judgement and insight intact, normal speech and appearance well-groomed            Assessment and Plan   (E72.12) MTHFR mutation (H)  (primary encounter diagnosis)  Comment: currently concerned about risk of developing a clot if she develops COVID19  Plan: discussed that not enough information/ data at this time to give a good answer. Recommend she take Folgard, =Folic acid 1mg, Pyridoxine 50mg and B12 4000 mcg daily.     (E55.9) Vitamin D deficiency  Comment: currently taking 2000 international unit(s) daily  Plan: vitamin D3 (CHOLECALCIFEROL) 2000 units (50         mcg) tablet        Recheck in Aug/Sept of  "2020    (R79.0) Low ferritin  Comment: previous level was 9  Plan: CBC with Plt (LabDAQ), Ferritin        She takes med during menstrual cycle. Recheck in Aug/ Sept 2020      After Visit Information:  Patient chose to view AVS via Next Jump    No follow-ups on file.  Steph Lowe MD      Video-Visit Details         Video Visit Consent     The patient has been notified of following:     \"This video visit will be conducted via a call between you and your physician/provider. We have found that certain health care needs can be provided without the need for an in-person physical exam.  This service lets us provide the care you need with a video conversation.  If a prescription is necessary we can send it directly to your pharmacy.  If lab work is needed we can place an order for that and you can then stop by our lab to have the test done at a later time.    Video visits are billed at different rates depending on your insurance coverage.  Please reach out to your insurance provider with any questions.    If during the course of the call the physician/provider feels a video visit is not appropriate, you will not be charged for this service.\"    Patient has given verbal consent for Video visit? Yes    How would you like to obtain your AVS? Next Jump    Patient would like the video invitation sent by: Send to e-mail at: [email or web address]    Will anyone else be joining your video visit? No          Type of service:  Video Visit  Video Start Time: 8:39am  THIS is the time provider and patient connect.  Video End Time (time video stopped): 9:03 am  Total time is 24 min    Originating Location (pt. Location): Home    Distant Location (provider location):  [facility]     Mode of Communication:  Video Conference via [employer]                            "

## 2020-05-19 ENCOUNTER — VIRTUAL VISIT (OUTPATIENT)
Dept: FAMILY MEDICINE | Facility: CLINIC | Age: 30
End: 2020-05-19
Payer: COMMERCIAL

## 2020-05-19 DIAGNOSIS — E55.9 VITAMIN D DEFICIENCY: ICD-10-CM

## 2020-05-19 DIAGNOSIS — Z15.89 MTHFR MUTATION: Primary | ICD-10-CM

## 2020-05-19 DIAGNOSIS — R79.0 LOW FERRITIN: ICD-10-CM

## 2020-05-19 RX ORDER — CHOLECALCIFEROL (VITAMIN D3) 50 MCG
1 TABLET ORAL DAILY
Qty: 90 TABLET | Refills: 3 | COMMUNITY
Start: 2020-05-19 | End: 2021-09-13

## 2020-05-19 NOTE — TELEPHONE ENCOUNTER
I discussed issue with Katie during virtual visit May 19, 2020.  Steph Lowe MD  Internal Medicine/Pediatrics

## 2020-05-20 NOTE — PATIENT INSTRUCTIONS
Folgard is a mixture of the following:    Folic Acid 1mg  Pyridoxine 50mg daily  Cyancobalamin 400mcg daily    Return for nonfasting labs in Aug/Sept 2020.    Steph Lowe MD  Internal Medicine/Pediatrics

## 2020-12-27 ENCOUNTER — HEALTH MAINTENANCE LETTER (OUTPATIENT)
Age: 30
End: 2020-12-27

## 2021-04-24 ENCOUNTER — HEALTH MAINTENANCE LETTER (OUTPATIENT)
Age: 31
End: 2021-04-24

## 2021-09-08 NOTE — PATIENT INSTRUCTIONS
Send me copy of your health care directive    1200mg calcium  1000 international unit(s) vitamin D daily    Premenstual syndrome, anxiety   Consider use of fluoxetine (generic prozac)  Send me my chart message if you wish to start medication    Preventive Health Recommendations  Female Ages 26 - 39  Yearly exam:   See your health care provider every year in order to    Review health changes.     Discuss preventive care.      Review your medicines if you your doctor has prescribed any.    Until age 30: Get a Pap test every three years (more often if you have had an abnormal result).    After age 30: Talk to your doctor about whether you should have a Pap test every 3 years or have a Pap test with HPV screening every 5 years.   You do not need a Pap test if your uterus was removed (hysterectomy) and you have not had cancer.  You should be tested each year for STDs (sexually transmitted diseases), if you're at risk.   Talk to your provider about how often to have your cholesterol checked.  If you are at risk for diabetes, you should have a diabetes test (fasting glucose).  Shots: Get a flu shot each year. Get a tetanus shot every 10 years.   Nutrition:     Eat at least 5 servings of fruits and vegetables each day.    Eat whole-grain bread, whole-wheat pasta and brown rice instead of white grains and rice.    Get adequate Calcium and Vitamin D.     Lifestyle    Exercise at least 150 minutes a week (30 minutes a day, 5 days of the week). This will help you control your weight and prevent disease.    Limit alcohol to one drink per day.    No smoking.     Wear sunscreen to prevent skin cancer.    See your dentist every six months for an exam and cleaning.

## 2021-09-08 NOTE — PROGRESS NOTES
Katie Means is a 32 yo woman with hx of MTHFR mutation, tension headache and eczema. She is here for a preventive visit.     She is not fasting. She is not up to date on eye exams. Does not wear glasses or contacts, no vision changes or concerns. Both of her parents need reading glasses. She is not up to date on dental visits. Wears seat belt-yes. Bike helmet- yes.       HCM  Advanced Directive: completed but not on file, will send to me via MyChart  COVID vaccine series: UTD  Other vaccinations: due for Tdap booster, completed today  Hep C screening completes today  Pap normal 2020, next due 2023    Diet: She does not eat red meat, eats lentils and green leafy veggies. Eats some chicken and fish.  Weight gain 15 lbs  Wt Readings from Last 4 Encounters:   09/13/21 69.2 kg (152 lb 8 oz)   02/10/20 62.1 kg (137 lb)   12/16/19 62.6 kg (138 lb)   12/05/19 61.5 kg (135 lb 8 oz)     Body mass index is 23.94 kg/m .       Low ferritin  Katie was found to have a low ferritin of 9 at her physical exam on 2/10/20. Hgb was 13. She reported monthly menses with 5-6 days of flow. She changes protection roughly 3-4x per day. She does not eat red meat, eats chicken and fish. Eats lentils and green leafy veggies. She denies thinning of hair or fatigue.  Previously she had taken iron daily during her menses, but has not been doing this for several months. No ice craving.       Low vitamin D  Katie had a Vitamin D level of 20 when checked in Feb 2020. She had been taking vitamin D 2000 international unit(s) daily during the winter months, but has not taken it since.      Premenstrual syndrome symptoms  See below    Health Maintenance   Topic Date Due     ADVANCE CARE PLANNING  Never done     HEPATITIS C SCREENING  Never done     DTAP/TDAP/TD IMMUNIZATION (2 - Td or Tdap) 08/20/2020     PHQ-2  01/01/2021     INFLUENZA VACCINE (1) 09/01/2021     PREVENTIVE CARE VISIT  09/13/2022     PAP  02/10/2023     HIV SCREENING  Completed      "COVID-19 Vaccine  Completed     Pneumococcal Vaccine: Pediatrics (0 to 5 Years) and At-Risk Patients (6 to 64 Years)  Aged Out     IPV IMMUNIZATION  Aged Out     MENINGITIS IMMUNIZATION  Aged Out     HEPATITIS B IMMUNIZATION  Aged Out         Patient Active Problem List   Diagnosis     Flexural eczema     MTHFR mutation (H)     Tension headache     History of urinary reflux       Past Surgical History:   Procedure Laterality Date     C LEVONORGESTREL-RELEASE INTRAUTERINE CONTRACEPTIVE (JONEL), 13.5 MG  4/2016    Jonel IUS, change in 3 yrs     REMOVE INTRAUTERINE DEVICE      jonel       Family History   Problem Relation Age of Onset     Heart Disease Maternal Grandmother      Hypertension Maternal Grandmother      Heart Disease Maternal Grandfather      Hypertension Maternal Grandfather      Hyperlipidemia Paternal Grandmother      Pancreatic Cancer Paternal Grandmother      Hyperlipidemia Paternal Grandfather      Hypertension Paternal Uncle      Lymphoma Maternal Uncle      Migraines Mother      Clotting Disorder Maternal Uncle         Factor V Leiden       Social  Partner (female), living together  Actor, has been doing voice-over work     HABITS:  Tob: never  ETOH: 0-1 per day  Calcium: 3/day. Vitamin D and calcium recommendations were reviewed and information sheet was given. Not on supplements.   Caffeine: sometimes decaf coffee  Exercise: Runner yoga 3x per week     OB/GYN HISTORY:  LMP: Patient's last menstrual period was 08/28/2021.  Periods are regular, 5 days of bleeding that is \"moderately heavy\". Changes protection 3-4x/day on heavy days.   Hx abnormal pap? normal pap 2020  STD hx? none  cycle length: monthly  dysmenorrhea/PMS: notices change in mood, sore and swollen breasts, breakouts on chin. Symptoms last about one week.  Contraception: none, female partner  G 0   P 0   A 0  Self Breast exam:  yes       Current Outpatient Medications   Medication Sig Dispense Refill     Ferrous Sulfate (IRON PO) Take " "325 mg by mouth       Ibuprofen (ADVIL PO) Take by mouth as needed for moderate pain       VITAMIN D PO Take 2,000 Units by mouth       vitamin D3 (CHOLECALCIFEROL) 2000 units (50 mcg) tablet Take 1 tablet (2,000 Units) by mouth daily 90 tablet 3     Allergies   Allergen Reactions     Other [No Clinical Screening - See Comments] Nausea and Vomiting     Shell Fish          ROS  CONSTITUTIONAL:NEGATIVE for fever, chills, POSITIVE change in weight + 15 pounds  INTEGUMENTARY/SKIN: NEGATIVE for worrisome rashes, moles or lesions  EYES: NEGATIVE for vision changes or irritation  ENT/MOUTH: NEGATIVE for ear, mouth and throat problems  RESP:NEGATIVE for significant cough or SOB  BREAST: NEGATIVE for masses, tenderness or discharge  CV: NEGATIVE for chest pain, palpitations, PINK, orthopnea, PND  or peripheral edema  GI: NEGATIVE for nausea, abdominal pain, heartburn, or change in bowel habits. Occasional loose stools with anxiety.  :NEGATIVE for frequency, dysuria, or hematuria  MUSCULOSKELETAL:NEGATIVE for significant arthralgias or myalgia  NEURO: NEGATIVE for weakness, dizziness or paresthesias  ENDOCRINE: NEGATIVE for polyuria/dipsia,  temperature intolerance, skin/hair changes  HEME/ALLERGY/IMMUNE: NEGATIVE for bleeding problems  PSYCHIATRIC:  hx of anxiety, occasional panic, meets with counselor regularly. Hx of PMDD.    EXAM  /86 (BP Location: Left arm, Patient Position: Sitting, Cuff Size: Adult Regular)   Pulse 72   Temp 97.5  F (36.4  C) (Temporal)   Resp 16   Ht 1.7 m (5' 6.93\")   Wt 69.2 kg (152 lb 8 oz)   LMP 08/28/2021   SpO2 100%   BMI 23.94 kg/m    MD BP recheck: 122/76      GENERAL APPEARANCE: Alert, pleasant, NAD  EYES: PERRL, EOMI, conjunctiva clear  HENT: TM normal bilaterally. Nose and mouth masked  NECK: no adenopathy, thyroid normal to palpation   RESP: lungs clear to auscultation bilaterally  BREAST: normal without masses, no tenderness or nipple discharge and no palpable  axillary " masses or adenopathy  CV: regular rate and rhythm, normal S1 S2, no murmur, no carotid bruits  ABDOMEN: soft, nontender, without HSM or masses. Bowel sounds normal  MS: extremities normal- no gross deformities noted, no tender, hot or swollen joints.    SKIN: no suspicious lesions or rashes  NEURO: Normal strength and tone, sensory exam grossly normal, DTR normoreflexive in upper and lower extremities  PSYCH: mentation appears normal. and affect normal/bright.  EXT: no peripheral edema, pedal pulses palpable    Assessment:  (Z00.00) Routine general medical examination at a health care facility  (primary encounter diagnosis)  Comment: 31 year old female in good health  Plan: Comprehensive Metabolic Panel        Today we discussed ways to maintain a healthy lifestyle with sensible eating, regular exercise and self cares. We dicussed calcium and Vitamin D intake, vaccinations and preventive health screens. Katie has a healthcare directive that is not on file, and she will send that to me via [employer].     (Z13.220) Screening for lipid disorders  Comment: not fasting  Plan: Lipid Panel    (Z23) Need for Tdap vaccination  Comment: Routine  Plan: TDAP VACCINE (Adacel, Boostrix)  [[id number]]        Administered today.     (Z11.59) Encounter for hepatitis C screening test for low risk patient  Comment: Routin  Plan: Hepatitis C Screen Reflex to HCV RNA Quant and         Genotype    (R79.0) Low ferritin  Comment: hx of low iron, does not eat red meat, moderately heavy menses for 5-6 days, was taking an iron supplement during menses but discontinued a few months ago  Plan: CBC with Platelets, Ferritin        Labs completed today, results pending. Recommend restarting iron supplements if ferritin is low today.    (E55.9) Vitamin D deficiency  Comment: hx of low vitamin D, had been taking 2000 international unit(s) in the fall and winter  Plan: Vitamin D deficiency screening        Labs completed today, results pending. Recommend  restarting Vitamin D supplement. Discussed that increasing Vitamin D may help with PMS symptoms.     (F32.81) PMDD (premenstrual dysphoric disorder)  Comment: one week of symptoms prior to menses  Plan: discussed option to start fluoxetine to treat this and symptoms of anxiety. She will consider and contact me via [employer].      Steph Lowe MD  Internal Medicine/Pediatrics      I, Radha Fonseca, am serving as a scribe to document services personally performed by Dr. Steph Lowe, based on data collection and the provider's statements to me. Dr. Lowe has reviewed, edited, and approved the above note.

## 2021-09-13 ENCOUNTER — OFFICE VISIT (OUTPATIENT)
Dept: FAMILY MEDICINE | Facility: CLINIC | Age: 31
End: 2021-09-13
Payer: COMMERCIAL

## 2021-09-13 VITALS
HEIGHT: 67 IN | DIASTOLIC BLOOD PRESSURE: 76 MMHG | SYSTOLIC BLOOD PRESSURE: 112 MMHG | TEMPERATURE: 97.5 F | HEART RATE: 72 BPM | RESPIRATION RATE: 16 BRPM | BODY MASS INDEX: 23.93 KG/M2 | OXYGEN SATURATION: 100 % | WEIGHT: 152.5 LBS

## 2021-09-13 DIAGNOSIS — E55.9 VITAMIN D DEFICIENCY: ICD-10-CM

## 2021-09-13 DIAGNOSIS — Z13.220 SCREENING FOR LIPID DISORDERS: ICD-10-CM

## 2021-09-13 DIAGNOSIS — Z11.59 ENCOUNTER FOR HEPATITIS C SCREENING TEST FOR LOW RISK PATIENT: ICD-10-CM

## 2021-09-13 DIAGNOSIS — Z23 NEED FOR TDAP VACCINATION: ICD-10-CM

## 2021-09-13 DIAGNOSIS — Z00.00 ROUTINE GENERAL MEDICAL EXAMINATION AT A HEALTH CARE FACILITY: Primary | ICD-10-CM

## 2021-09-13 DIAGNOSIS — F32.81 PMDD (PREMENSTRUAL DYSPHORIC DISORDER): ICD-10-CM

## 2021-09-13 DIAGNOSIS — R79.0 LOW FERRITIN: ICD-10-CM

## 2021-09-13 LAB
ALBUMIN SERPL-MCNC: 4.2 G/DL (ref 3.4–5)
ALP SERPL-CCNC: 44 U/L (ref 40–150)
ALT SERPL W P-5'-P-CCNC: 44 U/L (ref 0–50)
ANION GAP SERPL CALCULATED.3IONS-SCNC: 5 MMOL/L (ref 3–14)
AST SERPL W P-5'-P-CCNC: 25 U/L (ref 0–45)
BILIRUB SERPL-MCNC: 0.3 MG/DL (ref 0.2–1.3)
BUN SERPL-MCNC: 15 MG/DL (ref 7–30)
CALCIUM SERPL-MCNC: 9.3 MG/DL (ref 8.5–10.1)
CHLORIDE BLD-SCNC: 106 MMOL/L (ref 94–109)
CHOLEST SERPL-MCNC: 257 MG/DL
CO2 SERPL-SCNC: 28 MMOL/L (ref 20–32)
CREAT SERPL-MCNC: 0.87 MG/DL (ref 0.52–1.04)
ERYTHROCYTE [DISTWIDTH] IN BLOOD BY AUTOMATED COUNT: 12.7 % (ref 10–15)
FASTING STATUS PATIENT QL REPORTED: NO
FERRITIN SERPL-MCNC: 8 NG/ML (ref 12–150)
GFR SERPL CREATININE-BSD FRML MDRD: 90 ML/MIN/1.73M2
GLUCOSE BLD-MCNC: 80 MG/DL (ref 70–99)
HCT VFR BLD AUTO: 40.8 % (ref 35–47)
HDLC SERPL-MCNC: 88 MG/DL
HGB BLD-MCNC: 13.4 G/DL (ref 11.7–15.7)
LDLC SERPL CALC-MCNC: 157 MG/DL
MCH RBC QN AUTO: 29.4 PG (ref 26.5–33)
MCHC RBC AUTO-ENTMCNC: 32.8 G/DL (ref 31.5–36.5)
MCV RBC AUTO: 90 FL (ref 78–100)
NONHDLC SERPL-MCNC: 169 MG/DL
PLATELET # BLD AUTO: 212 10E3/UL (ref 150–450)
POTASSIUM BLD-SCNC: 4 MMOL/L (ref 3.4–5.3)
PROT SERPL-MCNC: 8 G/DL (ref 6.8–8.8)
RBC # BLD AUTO: 4.56 10E6/UL (ref 3.8–5.2)
SODIUM SERPL-SCNC: 139 MMOL/L (ref 133–144)
TRIGL SERPL-MCNC: 61 MG/DL
WBC # BLD AUTO: 5.9 10E3/UL (ref 4–11)

## 2021-09-13 PROCEDURE — 82306 VITAMIN D 25 HYDROXY: CPT | Performed by: INTERNAL MEDICINE

## 2021-09-13 PROCEDURE — 80061 LIPID PANEL: CPT | Performed by: INTERNAL MEDICINE

## 2021-09-13 PROCEDURE — 82040 ASSAY OF SERUM ALBUMIN: CPT | Performed by: INTERNAL MEDICINE

## 2021-09-13 PROCEDURE — 82728 ASSAY OF FERRITIN: CPT | Performed by: INTERNAL MEDICINE

## 2021-09-13 PROCEDURE — 86803 HEPATITIS C AB TEST: CPT | Performed by: INTERNAL MEDICINE

## 2021-09-13 ASSESSMENT — MIFFLIN-ST. JEOR: SCORE: 1448.24

## 2021-09-13 NOTE — NURSING NOTE
"31 year old  Chief Complaint   Patient presents with     Physical     31 yrs old non fasting and pap LMP 8/28/2021       Blood pressure 135/86, pulse 72, temperature 97.5  F (36.4  C), temperature source Temporal, resp. rate 16, height 1.7 m (5' 6.93\"), weight 69.2 kg (152 lb 8 oz), last menstrual period 08/28/2021, SpO2 100 %, not currently breastfeeding. Body mass index is 23.94 kg/m .  Patient Active Problem List   Diagnosis     Flexural eczema     MTHFR mutation (H)     Tension headache     History of urinary reflux       Wt Readings from Last 2 Encounters:   09/13/21 69.2 kg (152 lb 8 oz)   02/10/20 62.1 kg (137 lb)     BP Readings from Last 3 Encounters:   09/13/21 135/86   02/10/20 113/75   12/16/19 124/88         Current Outpatient Medications   Medication     Ferrous Sulfate (IRON PO)     Ibuprofen (ADVIL PO)     VITAMIN D PO     vitamin D3 (CHOLECALCIFEROL) 2000 units (50 mcg) tablet     No current facility-administered medications for this visit.       Social History     Tobacco Use     Smoking status: Never Smoker     Smokeless tobacco: Never Used   Substance Use Topics     Alcohol use: Yes     Alcohol/week: 0.0 standard drinks     Comment: 4/week     Drug use: No       Health Maintenance Due   Topic Date Due     ADVANCE CARE PLANNING  Never done     HEPATITIS C SCREENING  Never done     DTAP/TDAP/TD IMMUNIZATION (2 - Td or Tdap) 08/20/2020     PHQ-2  01/01/2021     INFLUENZA VACCINE (1) 09/01/2021       Lab Results   Component Value Date    PAP NIL 02/10/2020         September 13, 2021 12:59 PM  "

## 2021-09-14 LAB
DEPRECATED CALCIDIOL+CALCIFEROL SERPL-MC: 31 UG/L (ref 20–75)
HCV AB SERPL QL IA: NONREACTIVE

## 2021-10-09 ENCOUNTER — HEALTH MAINTENANCE LETTER (OUTPATIENT)
Age: 31
End: 2021-10-09

## 2022-01-07 ENCOUNTER — MYC MEDICAL ADVICE (OUTPATIENT)
Dept: FAMILY MEDICINE | Facility: CLINIC | Age: 32
End: 2022-01-07
Payer: COMMERCIAL

## 2022-01-07 ENCOUNTER — TELEPHONE (OUTPATIENT)
Dept: FAMILY MEDICINE | Facility: CLINIC | Age: 32
End: 2022-01-07
Payer: COMMERCIAL

## 2022-01-07 NOTE — TELEPHONE ENCOUNTER
Who is calling? Patient  Reason for Call:She has a question regarding testing? I do not believe it has to do with whether we do testing.    Jeanette

## 2022-01-07 NOTE — TELEPHONE ENCOUNTER
Called and spoke with Katie regarding her test results.  Both she and her partner are asymptomatic and after one positive test (which I believe was likely a false positive) on the 5th have had subsequent negative tests daily since.  Being that they are 5 days past their initial testing on 1/2/22, there is no further need to isolate from one another.  Reviewed the symptoms to watch for scratchy/sore throat, nasal congestion, cough, fatigue, body aches, diarrhea or fever, and should either of them develop any or multiple symptoms listed above, they should both be tested.  Katelynn Mcnamara, RN, BSN  [facility]  01/07/22  2:45 PM

## 2022-03-23 ENCOUNTER — VIRTUAL VISIT (OUTPATIENT)
Dept: FAMILY MEDICINE | Facility: CLINIC | Age: 32
End: 2022-03-23
Payer: COMMERCIAL

## 2022-03-23 DIAGNOSIS — R79.89 LOW VITAMIN D LEVEL: ICD-10-CM

## 2022-03-23 DIAGNOSIS — Z13.220 LIPID SCREENING: ICD-10-CM

## 2022-03-23 DIAGNOSIS — L65.9 HAIR THINNING: Primary | ICD-10-CM

## 2022-03-23 RX ORDER — VIT B COMP/FOLIC/CHOLINE/INOSI 400-20-50
CAPSULE ORAL
Qty: 90 CAPSULE | Refills: 3
Start: 2022-03-23

## 2022-03-23 RX ORDER — PNV NO.95/FERROUS FUM/FOLIC AC 28MG-0.8MG
TABLET ORAL
Qty: 90 TABLET | Refills: 3
Start: 2022-03-23

## 2022-03-23 NOTE — PROGRESS NOTES
Katie is a 31 year old who is being evaluated via a billable video visit.      How would you like to obtain your AVS? MyChart  If the video visit is dropped, the invitation should be resent by: Text to cell phone: [phone]  Will anyone else be joining your video visit? No      Video Start Time: 4:39 PM   Video End Time: 4:50 PM  Total video time: 11 minutes    Assessment & Plan     (L65.9) Hair thinning  (primary encounter diagnosis)  Comment: increased generalized hair thinning over the past month, hx of low ferritin in the context of heavy periods, has not been taking iron supplement with menses for the past couple of months  Plan: Ferritin, CBC with Platelets, TSH with free T4         reflex        Labs ordered today to be completed at a future lab visit. Recommended resuming iron supplement with menses.     (R79.89) Low vitamin D level  Comment: taking Vitamin D 2000 IU daily  Plan: Vitamin D Deficiency        Labs ordered today to be completed at a future lab visit.     (Z13.220) Lipid screening  Comment: total cholesterol and LDL elevated in Sept 2021 but Katie was not fasting, interested in completing fasting labs. Father has a hx of hyperlipidemia treated with red yeast rice  Plan: Lipid Profile        Labs ordered today to be completed at a future lab visit. Advised her to fast for these labs.       Steph Lowe MD  Internal Medicine/Pediatrics      I, Radha Fonseca, am serving as a scribe to document services personally performed by Dr. Steph Lowe, based on data collection and the provider's statements to me. Dr. Lowe has reviewed, edited, and approved the above note.       Subjective   Katie is a 31 year old who presents for the following health issues:    HPI     Hair thinning  Katie reports some hair thinning that has been ongoing for about a month. She typically has hair shedding but feels that she is shedding more than usual, especially in the shower. She is not losing hair from a specific  spot.    She has a hx of low ferritin, last level was 8 on 9/13/21. Hemoglobin level has been normal. Monthly menses are 5 days of flow, changing protection no more than every 3-3.5 hours on the heaviest days. She does not eat red meat, eats chicken and fish once a week. Also eats lentils and green leafy veggies. She had started taking one iron pill each day of her period but has not been good at doing this for the past couple of cycles. Not currently taking a Vitamin B complex or supplement. No previous TSH on file. No known family hx of thyroid disease. Feels like she has had more stress over the past two years.       Vitamin D level  Katie has been taking a Vitamin D 2000 IU supplement daily. She would like to have her Vitamin D level checked at a future lab visit.      Elevated cholesterol and LDL  Katie's last lipid panel on 9/13/21 found an elevated LDL and cholesterol, non-fasting. She has a family hx of hyperlipidemia in her PGF and father. Her father has had success with red yeast rice supplements. She is interested in completed fasting labs in addition to above lab work.    Recent Labs   Lab Test 09/13/21  1347 02/10/20  1423   CHOL 257* 239.0*   HDL 88 84.0   * 142.0*   TRIG 61 62.0   CHOLHDLRATIO  --  2.8         Review of Systems   Constitutional, HEENT, cardiovascular, pulmonary, gi and gu systems are negative, except as otherwise noted.      Objective       Vitals:  No vitals were obtained today due to virtual visit.    Physical Exam   GENERAL: Healthy, alert and no distress  EYES: Eyes grossly normal to inspection.  No discharge or erythema, or obvious scleral/conjunctival abnormalities.  RESP: No audible wheeze, cough, or visible cyanosis.  No visible retractions or increased work of breathing.    SKIN: Visible skin clear. No significant rash, abnormal pigmentation or lesions.  NEURO: Cranial nerves grossly intact.  Mentation and speech appropriate for age.  PSYCH: Mentation appears normal,  affect normal/bright, judgement and insight intact, normal speech and appearance well-groomed.          Video-Visit Details    Type of service:  Video Visit    Video End Time:4:50 PM    Originating Location (pt. Location): Home    Distant Location (provider location):  [facility]     Platform used for Video Visit: [employer]

## 2022-03-25 ENCOUNTER — LAB (OUTPATIENT)
Dept: LAB | Facility: CLINIC | Age: 32
End: 2022-03-25
Payer: COMMERCIAL

## 2022-03-25 DIAGNOSIS — R79.89 LOW VITAMIN D LEVEL: ICD-10-CM

## 2022-03-25 DIAGNOSIS — Z13.220 LIPID SCREENING: ICD-10-CM

## 2022-03-25 DIAGNOSIS — L65.9 HAIR THINNING: ICD-10-CM

## 2022-04-06 ENCOUNTER — LAB (OUTPATIENT)
Dept: LAB | Facility: CLINIC | Age: 32
End: 2022-04-06
Payer: COMMERCIAL

## 2022-04-06 DIAGNOSIS — Z13.220 LIPID SCREENING: ICD-10-CM

## 2022-04-06 DIAGNOSIS — L65.9 HAIR THINNING: ICD-10-CM

## 2022-04-06 DIAGNOSIS — R79.89 LOW VITAMIN D LEVEL: ICD-10-CM

## 2022-04-06 LAB
CHOLEST SERPL-MCNC: 256 MG/DL
DEPRECATED CALCIDIOL+CALCIFEROL SERPL-MC: 25 UG/L (ref 20–75)
ERYTHROCYTE [DISTWIDTH] IN BLOOD BY AUTOMATED COUNT: 13 % (ref 10–15)
FASTING STATUS PATIENT QL REPORTED: YES
FERRITIN SERPL-MCNC: 30 NG/ML (ref 12–150)
HCT VFR BLD AUTO: 39.3 % (ref 35–47)
HDLC SERPL-MCNC: 91 MG/DL
HGB BLD-MCNC: 13.4 G/DL (ref 11.7–15.7)
LDLC SERPL CALC-MCNC: 159 MG/DL
MCH RBC QN AUTO: 30.5 PG (ref 26.5–33)
MCHC RBC AUTO-ENTMCNC: 34.1 G/DL (ref 31.5–36.5)
MCV RBC AUTO: 90 FL (ref 78–100)
NONHDLC SERPL-MCNC: 165 MG/DL
PLATELET # BLD AUTO: 197 10E3/UL (ref 150–450)
RBC # BLD AUTO: 4.39 10E6/UL (ref 3.8–5.2)
TRIGL SERPL-MCNC: 32 MG/DL
TSH SERPL DL<=0.005 MIU/L-ACNC: 0.83 MU/L (ref 0.4–4)
WBC # BLD AUTO: 3.8 10E3/UL (ref 4–11)

## 2022-04-06 PROCEDURE — 80061 LIPID PANEL: CPT

## 2022-04-06 PROCEDURE — 82306 VITAMIN D 25 HYDROXY: CPT

## 2022-04-06 PROCEDURE — 82728 ASSAY OF FERRITIN: CPT

## 2022-04-06 PROCEDURE — 84443 ASSAY THYROID STIM HORMONE: CPT

## 2022-07-13 ENCOUNTER — VIRTUAL VISIT (OUTPATIENT)
Dept: FAMILY MEDICINE | Facility: CLINIC | Age: 32
End: 2022-07-13
Payer: COMMERCIAL

## 2022-07-13 DIAGNOSIS — L98.9 SKIN LESION: Primary | ICD-10-CM

## 2022-07-13 NOTE — PROGRESS NOTES
"Katie is a 32 year old who is being evaluated via a billable video visit.    Can you please confirm what state you are currently located in? [place]     How would you like to obtain your AVS? MyChart  If the video visit is dropped, the invitation should be resent by: Text to cell phone: mobile   Will anyone else be joining your video visit? No    Assessment & Plan     Skin lesion  Unable to perform exam today but skin lesions sounds like it could be an ingrown hair follicle and already sounds like it is improving. Katie reports it has gotten smaller already and has no associated symptoms. Recommend monitoring -- if any new symptoms occur or lesion gets larger, fluid-filled, painful, etc then she should RTC for a pelvic exam. Otherwise, symptomatic care as needed (warm compresses, Epsom salt baths). All questions answered. Return if symptoms worsen or fail to improve.    Asya De La Fuente MD  [facility]    Subjective   Katie is a 32 year old, presenting for the following health issues: Derm Problem (Pelvic bump, has questions and will come in later for exam )      HPI   Katie found a small red bump in the genital area. Noticed Saturday evening and Sunday morning, smaller and less red since then. Has never had anything like this before. Not painful. A little itchy at first. No vaginal discharge. No abdominal pain, fevers, chills, nausea, vomiting. No urinary symptoms. One partner, mutually monogamous, partner has no symptoms or skin lesions. Katie has no concerns regarding STI. She started her menses today.         Objective    Vitals - Patient Reported  Weight (Patient Reported): 65.8 kg (145 lb)  Height (Patient Reported): 170.2 cm (5' 7\")  BMI (Based on Pt Reported Ht/Wt): 22.71      Vitals:  No vitals were obtained today due to virtual visit.    Physical Exam   GENERAL: Healthy, alert and no distress  SKIN: Visible skin clear. No significant rash, abnormal pigmentation or lesions.  PSYCH: Mentation appears normal, " affect normal/bright, judgement and insight intact, normal speech and appearance well-groomed.          Video-Visit Details    Video Time: 6 minutes    Type of service:  Video Visit    Originating Location (pt. Location): Home    Distant Location (provider location):  [facility]     Platform used for Video Visit: [employer]    .  ..   Full term live female child delivered over intact perineum in CARLOS position. tight nuchal x 2 recognized and clamped and cut at perineum. Mouth and nose bulb suctioned at perineum after anterior shoulder delivered. Posterior shoulder and body delivered with ease.  Baby to mother for skin to skin. Spont cry noted. Cord segment for gases obtained. Placenta delivered intact. Uterus massaged until firm and cleared of all clots and debris. Cervix and vagina intact. Excellent hemostasis noted. Apgars 8/9. Mother and baby to RR in stable condition.

## 2022-09-11 ENCOUNTER — HEALTH MAINTENANCE LETTER (OUTPATIENT)
Age: 32
End: 2022-09-11

## 2023-01-23 ENCOUNTER — HEALTH MAINTENANCE LETTER (OUTPATIENT)
Age: 33
End: 2023-01-23

## 2023-07-24 ENCOUNTER — OFFICE VISIT (OUTPATIENT)
Dept: FAMILY MEDICINE | Facility: CLINIC | Age: 33
End: 2023-07-24
Payer: COMMERCIAL

## 2023-07-24 VITALS
HEART RATE: 72 BPM | HEIGHT: 67 IN | TEMPERATURE: 98.2 F | DIASTOLIC BLOOD PRESSURE: 74 MMHG | OXYGEN SATURATION: 98 % | BODY MASS INDEX: 22.44 KG/M2 | SYSTOLIC BLOOD PRESSURE: 107 MMHG | WEIGHT: 143 LBS | RESPIRATION RATE: 15 BRPM

## 2023-07-24 DIAGNOSIS — Z00.00 ROUTINE GENERAL MEDICAL EXAMINATION AT A HEALTH CARE FACILITY: Primary | ICD-10-CM

## 2023-07-24 DIAGNOSIS — Z11.3 SCREENING FOR STDS (SEXUALLY TRANSMITTED DISEASES): ICD-10-CM

## 2023-07-24 DIAGNOSIS — Z13.220 LIPID SCREENING: ICD-10-CM

## 2023-07-24 DIAGNOSIS — N92.0 MENORRHAGIA WITH REGULAR CYCLE: ICD-10-CM

## 2023-07-24 DIAGNOSIS — Z12.4 PAP SMEAR FOR CERVICAL CANCER SCREENING: ICD-10-CM

## 2023-07-24 PROCEDURE — 87591 N.GONORRHOEAE DNA AMP PROB: CPT | Performed by: INTERNAL MEDICINE

## 2023-07-24 PROCEDURE — G0145 SCR C/V CYTO,THINLAYER,RESCR: HCPCS | Performed by: INTERNAL MEDICINE

## 2023-07-24 ASSESSMENT — ANXIETY QUESTIONNAIRES
5. BEING SO RESTLESS THAT IT IS HARD TO SIT STILL: NOT AT ALL
GAD7 TOTAL SCORE: 1
2. NOT BEING ABLE TO STOP OR CONTROL WORRYING: NOT AT ALL
7. FEELING AFRAID AS IF SOMETHING AWFUL MIGHT HAPPEN: NOT AT ALL
1. FEELING NERVOUS, ANXIOUS, OR ON EDGE: SEVERAL DAYS
GAD7 TOTAL SCORE: 1
IF YOU CHECKED OFF ANY PROBLEMS ON THIS QUESTIONNAIRE, HOW DIFFICULT HAVE THESE PROBLEMS MADE IT FOR YOU TO DO YOUR WORK, TAKE CARE OF THINGS AT HOME, OR GET ALONG WITH OTHER PEOPLE: NOT DIFFICULT AT ALL
3. WORRYING TOO MUCH ABOUT DIFFERENT THINGS: NOT AT ALL
6. BECOMING EASILY ANNOYED OR IRRITABLE: NOT AT ALL

## 2023-07-24 ASSESSMENT — ENCOUNTER SYMPTOMS
DIZZINESS: 0
NERVOUS/ANXIOUS: 0
CONSTIPATION: 0
HEMATOCHEZIA: 0
WEAKNESS: 0
SHORTNESS OF BREATH: 0
MYALGIAS: 0
NAUSEA: 0
COUGH: 0
DIARRHEA: 0
ABDOMINAL PAIN: 0
DYSURIA: 0
HEARTBURN: 0
BREAST MASS: 0
SORE THROAT: 0
HEMATURIA: 0
JOINT SWELLING: 0
CHILLS: 0
HEADACHES: 0
PARESTHESIAS: 0
PALPITATIONS: 0
FEVER: 0
ARTHRALGIAS: 0
EYE PAIN: 0
FREQUENCY: 0

## 2023-07-24 ASSESSMENT — PATIENT HEALTH QUESTIONNAIRE - PHQ9
5. POOR APPETITE OR OVEREATING: NOT AT ALL
SUM OF ALL RESPONSES TO PHQ QUESTIONS 1-9: 1

## 2023-07-24 NOTE — PROGRESS NOTES
"Katie Means is a 32 yo woman with hx of MTHFR mutation, tension headaches, acne and eczema. She is currently living in the [place] for the next year (partner going to school there). She returns today for preventive exam.   She is not fasting. She is overdue for routine eye exams and dental visits. She does have insurance coverage for this care. No current eye, vision, or dental concerns, overdue..      HCM  Advanced Directive: none on file  COVID vaccine series: eligible for bivalent, has been on a wait list while living in the [place]. Plans to get this while in the [place], will be [place] to the [place] in 3 weeks  Other vaccinations ?HPV series, she will check her records  Pap due now, last completed in 2020 (no co-testing)    Diet: Fish, veggies, fruit, no red meat  Wt Readings from Last 4 Encounters:   07/24/23 64.9 kg (143 lb)   09/13/21 69.2 kg (152 lb 8 oz)   02/10/20 62.1 kg (137 lb)   12/16/19 62.6 kg (138 lb)     Body mass index is 22.4 kg/m .    Hair Thinning  I saw Katie for a virtual appointment in 3/2023 for hair thinning. She reports she has stopped drinking carbonated water and feels her hair thinning may have slightly improved. She states \"I decided not to worry about it as much.\" She takes an iron supplement when on her period.     Rectal bump  Katie notes presence of a rectal bump. No bleeding but sometimes experiences pain. Denies constipation, diarrhea. Has not tried any topicals.     PMH, PSH, FH, medications, allergies and immunizations are reviewed/ updated this visit.    Social  Partner (female)  Lives in  with partner who is in grad school there  Actor,      HABITS:  Tob: never  ETOH: 0-1 per day  Calcium: 3/day. Takes a Vitamin D supplement.   Caffeine: None  Exercise: Runner, yoga 3x per week, cycling for transportation while living in the      OB/GYN HISTORY:  LMP: Patient's last menstrual period was 07/17/2023 (exact date). No bleeding today.  Periods are monthly, 5 days of " "bleeding that is \"moderately heavy.\" Changes protection 3-4x/day on heavy days.  Hx abnormal pap? no, normal pap 2002  STD hx? none, would like screening today  dysmenorrhea/PMS:  sore and swollen breasts, acne, symptoms last about one week. Reports this improves when she is more active/exercising more.   Vasomotor sx:  none  Contraception: none, female partner  G 0   P 0   A 0  Self Breast exam:  yes    Current Outpatient Medications   Medication Sig Dispense Refill     B Complex-Biotin-FA (SUPER B-50 B COMPLEX) CAPS Take one daily (Patient not taking: Reported on 7/24/2023) 90 capsule 3     cholecalciferol 25 MCG (1000 UT) TABS Take 2,000 Units by mouth daily (Patient not taking: Reported on 7/24/2023)       Ferrous Sulfate (IRON) 325 (65 Fe) MG tablet Take one daily on day of menses (Patient not taking: Reported on 7/24/2023) 90 tablet 3     Allergies   Allergen Reactions     Other [No Clinical Screening - See Comments] Nausea and Vomiting     Shell Fish          ROS  CONSTITUTIONAL:NEGATIVE for fever, chills, +weight down 10 lb from last visit  INTEGUMENTARY/SKIN: NEGATIVE for worrisome rashes, moles or lesions, uses topical for facial acne  EYES: NEGATIVE for vision changes or irritation  ENT/MOUTH: NEGATIVE for ear, mouth and throat problems  RESP:NEGATIVE for significant cough or SOB  BREAST: NEGATIVE for masses, tenderness or discharge  CV: NEGATIVE for chest pain, palpitations, PINK, orthopnea, PND  or peripheral edema  GI: NEGATIVE for nausea, abdominal pain, heartburn, or change in bowel habits +rectal \"bump\", hemorrhoid?  :NEGATIVE for frequency, dysuria, or hematuria  MUSCULOSKELETAL:NEGATIVE for significant arthralgias or myalgia, hx of tension headaches treated with stretching program  NEURO: NEGATIVE for weakness, dizziness or paresthesias  ENDOCRINE: NEGATIVE for polyuria/dipsia,  temperature intolerance, skin changes, some hair thinning  HEME/ALLERGY/IMMUNE: NEGATIVE for bleeding problems, +MTHFR " "gene  PSYCHIATRIC: NEGATIVE for changes in mood or affect    EXAM  /74 (BP Location: Right arm, Patient Position: Sitting, Cuff Size: Adult Regular)   Pulse 72   Temp 98.2  F (36.8  C) (Skin)   Resp 15   Ht 1.702 m (5' 7\")   Wt 64.9 kg (143 lb)   LMP 07/17/2023 (Exact Date)   SpO2 98%   BMI 22.40 kg/m      GENERAL APPEARANCE: Alert, pleasant, NAD  EYES: PERRL, EOMI, conjunctiva clear  HENT: TM normal bilaterally. Nose and mouth masked  NECK: no adenopathy, thyroid normal to palpation  RESP: lungs clear to auscultation bilaterally  BREAST: normal without masses, no tenderness or nipple discharge and no palpable  axillary masses or adenopathy  CV: regular rate and rhythm, normal S1 S2, no murmur, no carotid bruits  ABDOMEN: soft, nontender, without HSM or masses. Bowel sounds normal  : External genitalia without lesions, cervix is posterior normal, pap easily obtained, no abnormal discharge, bimanual exam without adnexal masses or tenderness, uterus non enlarged  Rectal exam: small external hemorrhoid, noninflamed. No bleeding  MS: extremities normal- no gross deformities noted, no tender, hot or swollen joints.   SKIN: no suspicious lesions or rashes, cafe au lait spot on left lower back  NEURO: Normal strength and tone, sensory exam grossly normal, DTR normoreflexive in upper and lower extremities  PSYCH: mentation appears normal. and affect normal/bright.  EXT: no peripheral edema, pedal pulses palpable    Assessment:  (Z00.00) Routine general medical examination at a health care facility  (primary encounter diagnosis)  Comment: Healthy 33 year old woman.   Plan: Today we discussed ways to maintain a healthy lifestyle with sensible eating, regular exercise and self cares. We dicussed calcium and Vitamin D intake, vaccinations and preventive health screens.   She will go to pharmacy for bivalent booster.  Will check medical records for HPV series and Hep B series. May make nurse appt for these " vaccines, if needed.     (Z12.4) Pap smear for cervical cancer screening  Comment: Routine screening.   Plan: Gynecologic Cytology (PAP Smear)        Pap completed, awaiting results. If normal, repeat in 5 yr    (Z13.220) Lipid screening  Comment: Not fasting, will return for labs  Plan: Lipid Profile          (Z11.3) Screening for STDs (sexually transmitted diseases)  Comment: No active symptoms, screening only.   Plan: Treponema Abs w Reflex to RPR and Titer,         Neisseria gonorrhoeae PCR, HIV Antigen Antibody        Combo        Will return for blood work.     (N92.0) Menorrhagia with regular cycle  Comment: Monthly cycle with heavy bleeding, uses iron supplement during this time.   Plan: CBC with platelets, Ferritin        Check levels.       I have reviewed, edited and approved the above scribed note.    Steph Lowe MD  Internal Medicine/Pediatrics      I, Julia Garcia, am serving as a scribe to document services personally performed by Dr. Steph Lowe, based on data collection and the provider's statements to me.        Answers for HPI/ROS submitted by the patient on 7/24/2023  Frequency of exercise:: 4-5 days/week  Getting at least 3 servings of Calcium per day:: Yes  Diet:: Regular (no restrictions)  Taking medications regularly:: Yes  Medication side effects:: None  Bi-annual eye exam:: NO  Dental care twice a year:: NO  Sleep apnea or symptoms of sleep apnea:: None  abdominal pain: No  Blood in stool: No  Blood in urine: No  chest pain: No  chills: No  congestion: No  constipation: No  cough: No  diarrhea: No  dizziness: No  ear pain: No  eye pain: No  nervous/anxious: No  fever: No  frequency: No  genital sores: No  headaches: No  hearing loss: No  heartburn: No  arthralgias: No  joint swelling: No  peripheral edema: No  mood changes: No  myalgias: No  nausea: No  dysuria: No  palpitations: No  Skin sensation changes: No  sore throat: No  urgency: No  rash: No  shortness of breath: No  visual  disturbance: No  weakness: No  pelvic pain: No  vaginal bleeding: No  vaginal discharge: No  tenderness: No  breast mass: No  breast discharge: No  Additional concerns today:: No  Duration of exercise:: 15-30 minutes

## 2023-07-24 NOTE — PATIENT INSTRUCTIONS
Upload photo of immunization card to My chart and send it to me  Due for HPV , series 3  Hepatitis B, series of 3  Chickenpox, clinical case  Measles vaccine    [email or web address]   or local pharmacy for the Bivalent      Preventive Health Recommendations  Female Ages 26 - 39  Yearly exam:   See your health care provider every year in order to  Review health changes.   Discuss preventive care.    Review your medicines if you your doctor has prescribed any.    Until age 30: Get a Pap test every three years (more often if you have had an abnormal result).    After age 30: Talk to your doctor about whether you should have a Pap test every 3 years or have a Pap test with HPV screening every 5 years.   You do not need a Pap test if your uterus was removed (hysterectomy) and you have not had cancer.  You should be tested each year for STDs (sexually transmitted diseases), if you're at risk.   Talk to your provider about how often to have your cholesterol checked.  If you are at risk for diabetes, you should have a diabetes test (fasting glucose).  Shots: Get a flu shot each year. Get a tetanus shot every 10 years.   Nutrition:   Eat at least 5 servings of fruits and vegetables each day.  Eat whole-grain bread, whole-wheat pasta and brown rice instead of white grains and rice.  Get adequate Calcium and Vitamin D.     Lifestyle  Exercise at least 150 minutes a week (30 minutes a day, 5 days of the week). This will help you control your weight and prevent disease.  Limit alcohol to one drink per day.  No smoking.   Wear sunscreen to prevent skin cancer.  See your dentist every six months for an exam and cleaning.

## 2023-07-24 NOTE — NURSING NOTE
"33 year old  Chief Complaint   Patient presents with    Physical       Blood pressure 107/74, pulse 72, temperature 98.2  F (36.8  C), temperature source Skin, resp. rate 15, height 1.702 m (5' 7\"), weight 64.9 kg (143 lb), last menstrual period 07/17/2023, SpO2 98 %, not currently breastfeeding. Body mass index is 22.4 kg/m .  Patient Active Problem List   Diagnosis    Flexural eczema    MTHFR mutation    Tension headache    History of urinary reflux       Wt Readings from Last 2 Encounters:   07/24/23 64.9 kg (143 lb)   09/13/21 69.2 kg (152 lb 8 oz)     BP Readings from Last 3 Encounters:   07/24/23 107/74   09/13/21 112/76   02/10/20 113/75         Current Outpatient Medications   Medication    B Complex-Biotin-FA (SUPER B-50 B COMPLEX) CAPS    cholecalciferol 25 MCG (1000 UT) TABS    Ferrous Sulfate (IRON) 325 (65 Fe) MG tablet     No current facility-administered medications for this visit.       Social History     Tobacco Use    Smoking status: Never    Smokeless tobacco: Never   Vaping Use    Vaping Use: Never used   Substance Use Topics    Alcohol use: Yes     Alcohol/week: 0.0 standard drinks of alcohol     Comment: 4/week    Drug use: No       Health Maintenance Due   Topic Date Due    ADVANCE CARE PLANNING  Never done    HEPATITIS B IMMUNIZATION (1 of 3 - 3-dose series) Never done    COVID-19 Vaccine (4 - Pfizer series) 01/25/2022    PHQ-2 (once per calendar year)  01/01/2023    PAP  02/10/2023       Lab Results   Component Value Date    PAP NIL 02/10/2020         July 24, 2023 1:29 PM    "

## 2023-07-25 LAB — N GONORRHOEA DNA SPEC QL NAA+PROBE: NEGATIVE

## 2023-07-26 ENCOUNTER — LAB (OUTPATIENT)
Dept: LAB | Facility: CLINIC | Age: 33
End: 2023-07-26
Payer: COMMERCIAL

## 2023-07-26 DIAGNOSIS — Z13.220 LIPID SCREENING: ICD-10-CM

## 2023-07-26 DIAGNOSIS — N92.0 MENORRHAGIA WITH REGULAR CYCLE: ICD-10-CM

## 2023-07-26 LAB
CHOLEST SERPL-MCNC: 238 MG/DL
ERYTHROCYTE [DISTWIDTH] IN BLOOD BY AUTOMATED COUNT: 13.6 % (ref 10–15)
FERRITIN SERPL-MCNC: 21 NG/ML (ref 6–175)
HCT VFR BLD AUTO: 41.1 % (ref 35–47)
HDLC SERPL-MCNC: 78 MG/DL
HGB BLD-MCNC: 13.3 G/DL (ref 11.7–15.7)
HIV 1+2 AB+HIV1 P24 AG SERPL QL IA: NONREACTIVE
LDLC SERPL CALC-MCNC: 147 MG/DL
MCH RBC QN AUTO: 29.7 PG (ref 26.5–33)
MCHC RBC AUTO-ENTMCNC: 32.4 G/DL (ref 31.5–36.5)
MCV RBC AUTO: 92 FL (ref 78–100)
NONHDLC SERPL-MCNC: 160 MG/DL
PLATELET # BLD AUTO: 233 10E3/UL (ref 150–450)
RBC # BLD AUTO: 4.48 10E6/UL (ref 3.8–5.2)
T PALLIDUM AB SER QL: NONREACTIVE
TRIGL SERPL-MCNC: 63 MG/DL
WBC # BLD AUTO: 4.7 10E3/UL (ref 4–11)

## 2023-07-26 PROCEDURE — 82728 ASSAY OF FERRITIN: CPT

## 2023-07-26 PROCEDURE — 80061 LIPID PANEL: CPT

## 2023-07-26 PROCEDURE — 87389 HIV-1 AG W/HIV-1&-2 AB AG IA: CPT | Performed by: INTERNAL MEDICINE

## 2023-07-26 PROCEDURE — 86780 TREPONEMA PALLIDUM: CPT | Performed by: INTERNAL MEDICINE

## 2023-07-27 LAB
BKR LAB AP GYN ADEQUACY: NORMAL
BKR LAB AP GYN INTERPRETATION: NORMAL
BKR LAB AP HPV REFLEX: NORMAL
BKR LAB AP LMP: NORMAL
BKR LAB AP PREVIOUS ABNORMAL: NORMAL
PATH REPORT.COMMENTS IMP SPEC: NORMAL
PATH REPORT.COMMENTS IMP SPEC: NORMAL
PATH REPORT.RELEVANT HX SPEC: NORMAL

## 2023-07-28 LAB
HUMAN PAPILLOMA VIRUS 16 DNA: NEGATIVE
HUMAN PAPILLOMA VIRUS 18 DNA: NEGATIVE
HUMAN PAPILLOMA VIRUS FINAL DIAGNOSIS: NORMAL
HUMAN PAPILLOMA VIRUS OTHER HR: NEGATIVE

## 2024-06-18 NOTE — PROGRESS NOTES
Katie is a 34 year old who is being evaluated via a billable video visit.    How would you like to obtain your AVS? MyChart  If the video visit is dropped, the invitation should be resent by: Text to cell phone: [phone]  Will anyone else be joining your video visit? No      Start time: 8:41am  End time: 9:02 am   Total : 21 minutes    ASSESSMENT:  (F43.10) PTSD (post-traumatic stress disorder)  (primary encounter diagnosis)  (F41.9) Anxiety  Comment: currently reports anxiety and PTSD both improving with therapy  Plan: continue therapy    (R42) Vertigo  (R40.4) Spell of altered consciousness  Comment: reports 20 yr hx of vertigo type symptoms, managed with Epley maneuver, about 5x per year,fleeting. Episodes trigger anxiety, no change in frequency, severity of symptoms. She is concerned these are seizures. I am not concerned about seizures at this time but could refer to neurologist for consultation when she returns. She is going to be living in [place] for 6-12 momths. May seek care there is frequency, severity of symptoms are escalating.   Plan: for now observe. Continue with therapy. Keep diary of symptoms.  She may benefit from referral to vestibular clinic / ENT when she returns.     (Z15.89) MTHFR mutation  Comment: no personal or family history of clotting, not currently on supplements  Plan: discussed resuming B complex vitamin, B1, B6, B12 vitamins.      Steph Lowe MD  Internal Medicine/Pediatrics      Subjective   Katie Means is a 32 yo woman with hx of MTHFR mutation, tension headaches, acne and eczema. Last seen in clinic 7/2023. She indicated she would be living in the [place] for the next year (partner going to school there). She presents today to discuss:       Social  Living with her partner  Moving to [place] end of July and plans to live there 6-12 mos      MTFHR  Has not been on supplements  No hx of clotting, no family hx of clotting    Dizziness, vertigo  First began about 20 yrs  "ago, had episodic vertigo  Experienced \"spells\" that \"felt like when a plane drops\"  Was initially told to use Flonase  Has never seen been formally evaluated at a vestibular clinic  Concerned that these episodes are seizures  Position change can trigger nausea, dizziness, Epley maneuver helps  Estimates 5x per year, symptoms usually fleeting  She is concerned these are seizures, feels very anxious when they happen and spends time working through the anxiety.   She was watching a travel blog and the woman in the blog had been diagnosed with epilepsy  No incontinence, no postictal state    PTSD /anxiety  Episodic triggers  In past 3 yr doing EMDR for PTSD, 2 episodes in past year  Hx of nightmares, improved since EMDR work  Overall anxiety and PTSD improved with therapy    Vitals:  No vitals were obtained today due to virtual visit.    Physical Exam   GENERAL: alert, becomes tearful at times, overwhelmed with emotion  EYES: Eyes grossly normal to inspection.  No discharge or erythema, or obvious scleral/conjunctival abnormalities.  RESP: No audible wheeze, cough, or visible cyanosis.    SKIN: Visible skin clear. No significant rash, abnormal pigmentation or lesions.  NEURO: Cranial nerves grossly intact.  Mentation and speech appropriate for age.  PSYCH: affect labile during our visit      Video-Visit Details    Type of service:  Video Visit   Originating Location (pt. Location): Home    Distant Location (provider location):  On-site  Platform used for Video Visit: Bailey  Signed Electronically by: Steph Lowe MD    "

## 2024-06-21 ENCOUNTER — VIRTUAL VISIT (OUTPATIENT)
Dept: FAMILY MEDICINE | Facility: CLINIC | Age: 34
End: 2024-06-21

## 2024-06-21 DIAGNOSIS — Z15.89 MTHFR MUTATION: ICD-10-CM

## 2024-06-21 DIAGNOSIS — F43.10 PTSD (POST-TRAUMATIC STRESS DISORDER): Primary | ICD-10-CM

## 2024-06-21 DIAGNOSIS — F41.9 ANXIETY: ICD-10-CM

## 2024-06-21 DIAGNOSIS — R40.4 SPELL OF ALTERED CONSCIOUSNESS: ICD-10-CM

## 2024-06-21 DIAGNOSIS — R42 VERTIGO: ICD-10-CM

## 2024-06-21 NOTE — NURSING NOTE
34 year old  Chief Complaint   Patient presents with    Follow Up     HA, travel upcoming       not currently breastfeeding. There is no height or weight on file to calculate BMI.  Patient Active Problem List   Diagnosis    Flexural eczema    MTHFR mutation    Tension headache    History of urinary reflux       Wt Readings from Last 2 Encounters:   07/24/23 64.9 kg (143 lb)   09/13/21 69.2 kg (152 lb 8 oz)     BP Readings from Last 3 Encounters:   07/24/23 107/74   09/13/21 112/76   02/10/20 113/75         Current Outpatient Medications   Medication Sig Dispense Refill    B Complex-Biotin-FA (SUPER B-50 B COMPLEX) CAPS Take one daily (Patient not taking: Reported on 7/24/2023) 90 capsule 3    cholecalciferol 25 MCG (1000 UT) TABS Take 2,000 Units by mouth daily (Patient not taking: Reported on 7/24/2023)      Ferrous Sulfate (IRON) 325 (65 Fe) MG tablet Take one daily on day of menses (Patient not taking: Reported on 7/24/2023) 90 tablet 3     No current facility-administered medications for this visit.       Social History     Tobacco Use    Smoking status: Never    Smokeless tobacco: Never   Vaping Use    Vaping status: Never Used   Substance Use Topics    Alcohol use: Yes     Alcohol/week: 0.0 standard drinks of alcohol     Comment: 4/week    Drug use: No       Health Maintenance Due   Topic Date Due    ADVANCE CARE PLANNING  Never done    HEPATITIS B IMMUNIZATION (1 of 3 - 19+ 3-dose series) Never done    COVID-19 Vaccine (4 - 2023-24 season) 09/01/2023       Lab Results   Component Value Date    PAP NIL 02/10/2020         June 21, 2024 8:19 AM

## 2024-06-22 PROBLEM — F41.9 ANXIETY: Status: ACTIVE | Noted: 2024-06-22

## 2024-09-21 ENCOUNTER — HEALTH MAINTENANCE LETTER (OUTPATIENT)
Age: 34
End: 2024-09-21

## 2025-05-07 SDOH — HEALTH STABILITY: PHYSICAL HEALTH: ON AVERAGE, HOW MANY MINUTES DO YOU ENGAGE IN EXERCISE AT THIS LEVEL?: 50 MIN

## 2025-05-07 SDOH — HEALTH STABILITY: PHYSICAL HEALTH: ON AVERAGE, HOW MANY DAYS PER WEEK DO YOU ENGAGE IN MODERATE TO STRENUOUS EXERCISE (LIKE A BRISK WALK)?: 6 DAYS

## 2025-05-07 ASSESSMENT — SOCIAL DETERMINANTS OF HEALTH (SDOH): HOW OFTEN DO YOU GET TOGETHER WITH FRIENDS OR RELATIVES?: TWICE A WEEK

## 2025-05-08 ENCOUNTER — OFFICE VISIT (OUTPATIENT)
Dept: FAMILY MEDICINE | Facility: CLINIC | Age: 35
End: 2025-05-08

## 2025-05-08 ENCOUNTER — RESULTS FOLLOW-UP (OUTPATIENT)
Dept: INTERNAL MEDICINE | Facility: CLINIC | Age: 35
End: 2025-05-08

## 2025-05-08 VITALS
TEMPERATURE: 96.3 F | HEART RATE: 74 BPM | DIASTOLIC BLOOD PRESSURE: 77 MMHG | SYSTOLIC BLOOD PRESSURE: 126 MMHG | BODY MASS INDEX: 23.49 KG/M2 | OXYGEN SATURATION: 98 % | WEIGHT: 150 LBS

## 2025-05-08 DIAGNOSIS — N92.0 MENORRHAGIA WITH REGULAR CYCLE: ICD-10-CM

## 2025-05-08 DIAGNOSIS — Z13.220 LIPID SCREENING: ICD-10-CM

## 2025-05-08 DIAGNOSIS — Z00.00 ROUTINE GENERAL MEDICAL EXAMINATION AT A HEALTH CARE FACILITY: Primary | ICD-10-CM

## 2025-05-08 DIAGNOSIS — Z15.89 MTHFR MUTATION: ICD-10-CM

## 2025-05-08 LAB
ERYTHROCYTE [DISTWIDTH] IN BLOOD BY AUTOMATED COUNT: 13.6 % (ref 10–15)
HCT VFR BLD AUTO: 38.9 % (ref 35–47)
HGB BLD-MCNC: 12.9 G/DL (ref 11.7–15.7)
MCH RBC QN AUTO: 29 PG (ref 26.5–33)
MCHC RBC AUTO-ENTMCNC: 33.2 G/DL (ref 31.5–36.5)
MCV RBC AUTO: 87 FL (ref 78–100)
PLATELET # BLD AUTO: 227 10E3/UL (ref 150–450)
RBC # BLD AUTO: 4.45 10E6/UL (ref 3.8–5.2)
WBC # BLD AUTO: 7.1 10E3/UL (ref 4–11)

## 2025-05-08 NOTE — PATIENT INSTRUCTIONS
"Send copy of previous immunizations  I can make recommendation  Check in HPV and Hepatitis B , each are series of 3      Advanced Care Planning  [email or web address]  RESOURCE tab  Scroll down to:  \"How can I learn more? \"  Under this tab are links to free classes on how to complete a health care directive     Patient Education   Preventive Care Advice   This is general advice given by our system to help you stay healthy. However, your care team may have specific advice just for you. Please talk to your care team about your preventive care needs.  Nutrition  Eat 5 or more servings of fruits and vegetables each day.  Try wheat bread, brown rice and whole grain pasta (instead of white bread, rice, and pasta).  Get enough calcium and vitamin D. Check the label on foods and aim for 100% of the RDA (recommended daily allowance).  Lifestyle  Exercise at least 150 minutes each week  (30 minutes a day, 5 days a week).  Do muscle strengthening activities 2 days a week. These help control your weight and prevent disease.  No smoking.  Wear sunscreen to prevent skin cancer.  Have a dental exam and cleaning every 6 months.  Yearly exams  See your health care team every year to talk about:  Any changes in your health.  Any medicines your care team has prescribed.  Preventive care, family planning, and ways to prevent chronic diseases.  Shots (vaccines)   HPV shots (up to age 26), if you've never had them before.  Hepatitis B shots (up to age 59), if you've never had them before.  COVID-19 shot: Get this shot when it's due.  Flu shot: Get a flu shot every year.  Tetanus shot: Get a tetanus shot every 10 years.  Pneumococcal, hepatitis A, and RSV shots: Ask your care team if you need these based on your risk.  Shingles shot (for age 50 and up)  General health tests  Diabetes screening:  Starting at age 35, Get screened for diabetes at least every 3 years.  If you are younger than age 35, ask your care team if you should be " screened for diabetes.  Cholesterol test: At age 39, start having a cholesterol test every 5 years, or more often if advised.  Bone density scan (DEXA): At age 50, ask your care team if you should have this scan for osteoporosis (brittle bones).  Hepatitis C: Get tested at least once in your life.  STIs (sexually transmitted infections)  Before age 24: Ask your care team if you should be screened for STIs.  After age 24: Get screened for STIs if you're at risk. You are at risk for STIs (including HIV) if:  You are sexually active with more than one person.  You don't use condoms every time.  You or a partner was diagnosed with a sexually transmitted infection.  If you are at risk for HIV, ask about PrEP medicine to prevent HIV.  Get tested for HIV at least once in your life, whether you are at risk for HIV or not.  Cancer screening tests  Cervical cancer screening: If you have a cervix, begin getting regular cervical cancer screening tests starting at age 21.  Breast cancer scan (mammogram): If you've ever had breasts, begin having regular mammograms starting at age 40. This is a scan to check for breast cancer.  Colon cancer screening: It is important to start screening for colon cancer at age 45.  Have a colonoscopy test every 10 years (or more often if you're at risk) Or, ask your provider about stool tests like a FIT test every year or Cologuard test every 3 years.  To learn more about your testing options, visit:   .  For help making a decision, visit:   https://bit.ly/cu03036.  Prostate cancer screening test: If you have a prostate, ask your care team if a prostate cancer screening test (PSA) at age 55 is right for you.  Lung cancer screening: If you are a current or former smoker ages 50 to 80, ask your care team if ongoing lung cancer screenings are right for you.  For informational purposes only. Not to replace the advice of your health care provider. Copyright   2023 [employer]. All rights  reserved. Clinically reviewed by the [facility]. [employer] [id number] - REV 01/24.

## 2025-05-08 NOTE — NURSING NOTE
Katie  34 year old    Chief Complaint   Patient presents with    Physical           Blood pressure 126/77, pulse 74, temperature (!) 96.3  F (35.7  C), weight 68 kg (150 lb), last menstrual period 04/23/2025, SpO2 98%, not currently breastfeeding. Body mass index is 23.49 kg/m .    Patient Active Problem List   Diagnosis    Flexural eczema    MTHFR mutation    Tension headache    History of urinary reflux    Vertigo    PTSD (post-traumatic stress disorder)    Anxiety             Wt Readings from Last 2 Encounters:   05/08/25 68 kg (150 lb)   07/24/23 64.9 kg (143 lb)       BP Readings from Last 3 Encounters:   05/08/25 126/77   07/24/23 107/74   09/13/21 112/76                Current Outpatient Medications   Medication Sig Dispense Refill    B Complex-Biotin-FA (SUPER B-50 B COMPLEX) CAPS Take one daily (Patient not taking: Reported on 7/24/2023) 90 capsule 3    cholecalciferol 25 MCG (1000 UT) TABS Take 2,000 Units by mouth daily (Patient not taking: Reported on 7/24/2023)      Ferrous Sulfate (IRON) 325 (65 Fe) MG tablet Take one daily on day of menses (Patient not taking: Reported on 7/24/2023) 90 tablet 3     No current facility-administered medications for this visit.             Social History     Tobacco Use    Smoking status: Never    Smokeless tobacco: Never   Vaping Use    Vaping status: Never Used   Substance Use Topics    Alcohol use: Yes     Alcohol/week: 0.0 standard drinks of alcohol     Comment: 4/week    Drug use: No             Health Maintenance Due   Topic Date Due    ADVANCE CARE PLANNING  Never done    HEPATITIS B IMMUNIZATION (1 of 3 - 19+ 3-dose series) Never done    COVID-19 Vaccine (4 - 2024-25 season) 09/01/2024             Lab Results   Component Value Date    PAP NIL 02/10/2020              May 8, 2025 1:30 PM

## 2025-05-08 NOTE — PROGRESS NOTES
"Katie Means is a 33 yo woman with hx of MTHFR mutation, episodic vertigo, tension headaches, acne and eczema. She has been in the  [place] for the past year (partner going to school there). She returns today for preventive exam. She is not fasting. She is due for eye exams and dental visits.    HCM  Advanced directive: none on file--given today  COVID vaccine --got one in [place] 12/2024  Other vaccines  recommend HPV series  + Hep B---she will send me immunization records  Colon cancer screening age 47  Pap completed 7/2023, due 2028  Mammogram age 42    Diet: Fish, veggies, fruit, rare red meat   Wt Readings from Last 4 Encounters:   05/08/25 68 kg (150 lb)   07/24/23 64.9 kg (143 lb)   09/13/21 69.2 kg (152 lb 8 oz)   02/10/20 62.1 kg (137 lb)     Body mass index is 23.49 kg/m .         MTHFR mutation  Had been on supplements in the past  No family hx of early ASCVD or clotting  No indication to treat at this time, literature suggests checking homocysteine levels only in symptomatic patients to assess risk for coronary artery disease but treatment with vitamin supplements does not reduce the coronary risk    Hemorrhoid  Some blood on toilet paper  +/- pain  No use of topicals  Occasional constipation  Irritation since Jan then saw blood in April  She is concerned this is something more than hemorrhoid    PMH, PSH, FH, medications, allergies and immunizations are updated this visit.      Social  Partner (female)  Lives in  with partner who is in grad school there  Will return to [place] in Sept 2025     HABITS:  Tob: never  ETOH: none  Calcium: 3/day. No Vitamin D supplement.   Caffeine: None--decaf  Exercise: Runner, hiking, kettle bells     OB/GYN HISTORY:  LMP: Patient's last menstrual period was 04/23/2025.  Periods are monthly, 5 days of bleeding that is \"moderately heavy.\" Changes protection 3-4x/day on heavy days.  Hx abnormal pap? normal 2023  STD screening--none  dysmenorrhea/PMS: yes  Vasomotor sx:  " none  Contraception: none, female partner  G 0   P 0   A 0  Self Breast exam:  yes      No current outpatient medications on file.     Allergies   Allergen Reactions    Other [No Clinical Screening - See Comments] Nausea and Vomiting     Shell Fish          ROS  CONSTITUTIONAL:NEGATIVE for fever, chills, change in weight  INTEGUMENTARY/SKIN: NEGATIVE for worrisome rashes, moles or lesions  EYES: NEGATIVE for vision changes or irritation  ENT/MOUTH: NEGATIVE for ear, mouth and throat problems  RESP:NEGATIVE for significant cough or SOB  CV: NEGATIVE for chest pain, palpitations, PINK, orthopnea, PND  or peripheral edema  GI: NEGATIVE for nausea, abdominal pain, heartburn, or change in bowel habits  ?hemorrhoids  :NEGATIVE for frequency, dysuria, or hematuria  MUSCULOSKELETAL:NEGATIVE for significant arthralgias or myalgia  NEURO: NEGATIVE for weakness, dizziness or paresthesias  ENDOCRINE: NEGATIVE for polyuria/dipsia,  temperature intolerance, skin/hair changes  HEME/ALLERGY/IMMUNE: NEGATIVE for bleeding problems, no hx of clots  PSYCHIATRIC: NEGATIVE for changes in mood or affect    EXAM  /77 (BP Location: Right arm, Cuff Size: Adult Regular)   Pulse 74   Temp (!) 96.3  F (35.7  C)   Wt 68 kg (150 lb)   LMP 04/23/2025   SpO2 98%   BMI 23.49 kg/m    GENERAL APPEARANCE: Alert, pleasant, NAD  EYES: PERRL, EOMI, conjunctiva clear  HENT: TM normal bilaterally. Nose and mouth without lesions  NECK: no adenopathy, thyroid normal to palpation  RESP: lungs clear to auscultation bilaterally  BREAST: normal without masses, no tenderness or nipple discharge and no palpable  axillary masses or adenopathy  CV: regular rate and rhythm, normal S1 S2, no murmur, no carotid bruits  ABDOMEN: soft, nontender, without HSM or masses. Bowel sounds normal  : External genitalia without lesions, cervix normal, pap easily obtained, no abnormal discharge, bimanual exam without adnexal masses or tenderness, uterus non enlarged    MS: extremities normal- no gross deformities noted, no tender, hot or swollen joints.    SKIN: no suspicious lesions or rashes  NEURO: Normal strength and tone, sensory exam grossly normal, DTR normoreflexive in upper and lower extremities  PSYCH: mentation appears normal. and affect normal/bright.  EXT: no peripheral edema, pedal pulses palpable    Assessment:  (Z00.00) Routine general medical examination at a health care facility  (primary encounter diagnosis)  Comment: 34 year old woman in good health  Plan: Today we discussed ways to maintain a healthy lifestyle with sensible eating, regular exercise and self cares. We dicussed calcium and Vitamin D intake, vaccinations and preventive health screens.  Return health care directive. She will send me copy of immunizations      (Z15.89) MTHFR mutation  Comment: no personal or immediate family hx of clotting or early ASCVD  Plan: no need for supplements    (N92.0) Menorrhagia with regular cycle  Comment: regular cycle  Plan: CBC with platelets, Ferritin        Check hemoglobin and ferritin level    (Z13.220) Lipid screening  Comment: not fasting  Plan: Lipid panel            Steph Lowe MD  Internal Medicine/Pediatrics

## 2025-05-09 LAB
CHOLEST SERPL-MCNC: 260 MG/DL
FASTING STATUS PATIENT QL REPORTED: NO
FERRITIN SERPL-MCNC: 17 NG/ML (ref 6–175)
HDLC SERPL-MCNC: 72 MG/DL
LDLC SERPL CALC-MCNC: 166 MG/DL
NONHDLC SERPL-MCNC: 188 MG/DL
TRIGL SERPL-MCNC: 111 MG/DL

## 2025-05-10 PROBLEM — K64.4 EXTERNAL HEMORRHOIDS: Status: ACTIVE | Noted: 2025-05-10

## 2025-06-30 NOTE — PROGRESS NOTES
SUBJECTIVE:  Katie Means is a 26 year old female who presents to clinic today for the following health issues:    Ear Fullness and Pain x 1 day  She has had thick mucus for 4 or 5 days. All day yesterday her ears felt full. She woke at 1AM with right otalgia. She used Advil at 4AM and sitting up. She still notices a dull ache on the Advil. She had some chills, sore throat, nasal congestion. She denies any GI symptoms. She was supposed to fly to [place] this morning but rescheduled due to pain.     Patient Active Problem List   Diagnosis     Flexural eczema     MTHFR mutation (H)     Tension headache     History of urinary reflux       Current Outpatient Prescriptions   Medication Sig Dispense Refill     Ibuprofen (ADVIL PO) Take by mouth as needed for moderate pain       Levonorgestrel (JONEL IU)          ROS:  The pertinent ROS is as per HPI, otherwise completely unremarkable.    OBJECTIVE:  /76  Pulse 75  Temp 98.3  F (36.8  C) (Oral)  Wt 148 lb (67.1 kg)  LMP 03/13/2017  SpO2 99%  BMI 23.73 kg/m2  Body mass index is 23.73 kg/(m^2).  GENERAL: healthy, alert and no distress  HENT: ear canals normal, right TM is erythematous with fluid, Left TM is wnl, nose and mouth without ulcers or lesions  NECK: no adenopathy, no asymmetry, masses, or scars  RESP: lungs clear to auscultation - no rales, rhonchi or wheezes  CV: regular rate and rhythm, normal S1 S2, no S3 or S4, no murmur, click or rub      ASSESSMENT/PLAN:  1. Right acute otitis media  She will use tylenol or ibuprofen as needed. She may also use Afrin for air travel. If her symptoms do not improve in 3 days or if they worsen she will call or return to clinic.  - amoxicillin-clavulanate (AUGMENTIN) 875-125 MG per tablet; Take 1 tablet by mouth 2 times daily  Dispense: 20 tablet; Refill: 0      Essie Lugo, MS, PA-C  [facility] [place]        
done